# Patient Record
Sex: FEMALE | Race: WHITE | NOT HISPANIC OR LATINO | URBAN - METROPOLITAN AREA
[De-identification: names, ages, dates, MRNs, and addresses within clinical notes are randomized per-mention and may not be internally consistent; named-entity substitution may affect disease eponyms.]

---

## 2017-04-13 ENCOUNTER — OUTPATIENT (OUTPATIENT)
Dept: OUTPATIENT SERVICES | Facility: HOSPITAL | Age: 52
LOS: 1 days | Discharge: HOME | End: 2017-04-13

## 2017-06-27 DIAGNOSIS — R06.02 SHORTNESS OF BREATH: ICD-10-CM

## 2019-06-25 ENCOUNTER — OUTPATIENT (OUTPATIENT)
Dept: OUTPATIENT SERVICES | Facility: HOSPITAL | Age: 54
LOS: 1 days | Discharge: HOME | End: 2019-06-25

## 2019-06-25 DIAGNOSIS — Z00.00 ENCOUNTER FOR GENERAL ADULT MEDICAL EXAMINATION WITHOUT ABNORMAL FINDINGS: ICD-10-CM

## 2019-06-25 DIAGNOSIS — E66.9 OBESITY, UNSPECIFIED: ICD-10-CM

## 2019-12-12 ENCOUNTER — APPOINTMENT (OUTPATIENT)
Dept: SURGERY | Facility: CLINIC | Age: 54
End: 2019-12-12

## 2020-02-19 PROBLEM — Z00.00 ENCOUNTER FOR PREVENTIVE HEALTH EXAMINATION: Status: ACTIVE | Noted: 2020-02-19

## 2020-02-25 ENCOUNTER — APPOINTMENT (OUTPATIENT)
Dept: SURGERY | Facility: CLINIC | Age: 55
End: 2020-02-25

## 2020-11-22 ENCOUNTER — EMERGENCY (EMERGENCY)
Facility: HOSPITAL | Age: 55
LOS: 0 days | Discharge: HOME | End: 2020-11-23
Attending: EMERGENCY MEDICINE | Admitting: EMERGENCY MEDICINE
Payer: COMMERCIAL

## 2020-11-22 VITALS
RESPIRATION RATE: 18 BRPM | OXYGEN SATURATION: 98 % | WEIGHT: 216.93 LBS | TEMPERATURE: 98 F | HEART RATE: 78 BPM | SYSTOLIC BLOOD PRESSURE: 180 MMHG | DIASTOLIC BLOOD PRESSURE: 97 MMHG

## 2020-11-22 DIAGNOSIS — R10.9 UNSPECIFIED ABDOMINAL PAIN: ICD-10-CM

## 2020-11-22 DIAGNOSIS — F17.210 NICOTINE DEPENDENCE, CIGARETTES, UNCOMPLICATED: ICD-10-CM

## 2020-11-22 DIAGNOSIS — R10.84 GENERALIZED ABDOMINAL PAIN: ICD-10-CM

## 2020-11-22 LAB
ALBUMIN SERPL ELPH-MCNC: 3.7 G/DL — SIGNIFICANT CHANGE UP (ref 3.5–5.2)
ALP SERPL-CCNC: 63 U/L — SIGNIFICANT CHANGE UP (ref 30–115)
ALT FLD-CCNC: 16 U/L — SIGNIFICANT CHANGE UP (ref 0–41)
ANION GAP SERPL CALC-SCNC: 12 MMOL/L — SIGNIFICANT CHANGE UP (ref 7–14)
APPEARANCE UR: CLEAR — SIGNIFICANT CHANGE UP
AST SERPL-CCNC: 17 U/L — SIGNIFICANT CHANGE UP (ref 0–41)
BACTERIA # UR AUTO: SIGNIFICANT CHANGE UP
BASOPHILS # BLD AUTO: 0.04 K/UL — SIGNIFICANT CHANGE UP (ref 0–0.2)
BASOPHILS NFR BLD AUTO: 0.7 % — SIGNIFICANT CHANGE UP (ref 0–1)
BILIRUB SERPL-MCNC: <0.2 MG/DL — SIGNIFICANT CHANGE UP (ref 0.2–1.2)
BILIRUB UR-MCNC: NEGATIVE — SIGNIFICANT CHANGE UP
BUN SERPL-MCNC: 18 MG/DL — SIGNIFICANT CHANGE UP (ref 10–20)
CALCIUM SERPL-MCNC: 8.7 MG/DL — SIGNIFICANT CHANGE UP (ref 8.5–10.1)
CHLORIDE SERPL-SCNC: 112 MMOL/L — HIGH (ref 98–110)
CO2 SERPL-SCNC: 20 MMOL/L — SIGNIFICANT CHANGE UP (ref 17–32)
COLOR SPEC: SIGNIFICANT CHANGE UP
CREAT SERPL-MCNC: 0.6 MG/DL — LOW (ref 0.7–1.5)
DIFF PNL FLD: SIGNIFICANT CHANGE UP
EOSINOPHIL # BLD AUTO: 0.17 K/UL — SIGNIFICANT CHANGE UP (ref 0–0.7)
EOSINOPHIL NFR BLD AUTO: 2.8 % — SIGNIFICANT CHANGE UP (ref 0–8)
EPI CELLS # UR: 5 /HPF — SIGNIFICANT CHANGE UP (ref 0–5)
GLUCOSE SERPL-MCNC: 107 MG/DL — HIGH (ref 70–99)
GLUCOSE UR QL: NEGATIVE — SIGNIFICANT CHANGE UP
HCT VFR BLD CALC: 41.2 % — SIGNIFICANT CHANGE UP (ref 37–47)
HGB BLD-MCNC: 13.2 G/DL — SIGNIFICANT CHANGE UP (ref 12–16)
HYALINE CASTS # UR AUTO: 0 /LPF — SIGNIFICANT CHANGE UP (ref 0–7)
IMM GRANULOCYTES NFR BLD AUTO: 0.3 % — SIGNIFICANT CHANGE UP (ref 0.1–0.3)
KETONES UR-MCNC: NEGATIVE — SIGNIFICANT CHANGE UP
LACTATE SERPL-SCNC: 1.7 MMOL/L — SIGNIFICANT CHANGE UP (ref 0.7–2)
LEUKOCYTE ESTERASE UR-ACNC: ABNORMAL
LIDOCAIN IGE QN: 37 U/L — SIGNIFICANT CHANGE UP (ref 7–60)
LYMPHOCYTES # BLD AUTO: 1.87 K/UL — SIGNIFICANT CHANGE UP (ref 1.2–3.4)
LYMPHOCYTES # BLD AUTO: 31.3 % — SIGNIFICANT CHANGE UP (ref 20.5–51.1)
MCHC RBC-ENTMCNC: 30.1 PG — SIGNIFICANT CHANGE UP (ref 27–31)
MCHC RBC-ENTMCNC: 32 G/DL — SIGNIFICANT CHANGE UP (ref 32–37)
MCV RBC AUTO: 93.8 FL — SIGNIFICANT CHANGE UP (ref 81–99)
MONOCYTES # BLD AUTO: 0.69 K/UL — HIGH (ref 0.1–0.6)
MONOCYTES NFR BLD AUTO: 11.6 % — HIGH (ref 1.7–9.3)
NEUTROPHILS # BLD AUTO: 3.18 K/UL — SIGNIFICANT CHANGE UP (ref 1.4–6.5)
NEUTROPHILS NFR BLD AUTO: 53.3 % — SIGNIFICANT CHANGE UP (ref 42.2–75.2)
NITRITE UR-MCNC: NEGATIVE — SIGNIFICANT CHANGE UP
NRBC # BLD: 0 /100 WBCS — SIGNIFICANT CHANGE UP (ref 0–0)
PH UR: 6 — SIGNIFICANT CHANGE UP (ref 5–8)
PLATELET # BLD AUTO: 175 K/UL — SIGNIFICANT CHANGE UP (ref 130–400)
POTASSIUM SERPL-MCNC: 3.9 MMOL/L — SIGNIFICANT CHANGE UP (ref 3.5–5)
POTASSIUM SERPL-SCNC: 3.9 MMOL/L — SIGNIFICANT CHANGE UP (ref 3.5–5)
PROT SERPL-MCNC: 6 G/DL — SIGNIFICANT CHANGE UP (ref 6–8)
PROT UR-MCNC: NEGATIVE — SIGNIFICANT CHANGE UP
RBC # BLD: 4.39 M/UL — SIGNIFICANT CHANGE UP (ref 4.2–5.4)
RBC # FLD: 13 % — SIGNIFICANT CHANGE UP (ref 11.5–14.5)
RBC CASTS # UR COMP ASSIST: 2 /HPF — SIGNIFICANT CHANGE UP (ref 0–4)
SODIUM SERPL-SCNC: 144 MMOL/L — SIGNIFICANT CHANGE UP (ref 135–146)
SP GR SPEC: 1.03 — SIGNIFICANT CHANGE UP (ref 1.01–1.03)
UROBILINOGEN FLD QL: SIGNIFICANT CHANGE UP
WBC # BLD: 5.97 K/UL — SIGNIFICANT CHANGE UP (ref 4.8–10.8)
WBC # FLD AUTO: 5.97 K/UL — SIGNIFICANT CHANGE UP (ref 4.8–10.8)
WBC UR QL: 10 /HPF — HIGH (ref 0–5)

## 2020-11-22 PROCEDURE — 71045 X-RAY EXAM CHEST 1 VIEW: CPT | Mod: 26

## 2020-11-22 PROCEDURE — 93010 ELECTROCARDIOGRAM REPORT: CPT

## 2020-11-22 PROCEDURE — 71275 CT ANGIOGRAPHY CHEST: CPT | Mod: 26

## 2020-11-22 PROCEDURE — 74174 CTA ABD&PLVS W/CONTRAST: CPT | Mod: 26

## 2020-11-22 PROCEDURE — 99285 EMERGENCY DEPT VISIT HI MDM: CPT

## 2020-11-22 RX ORDER — DEXAMETHASONE 0.5 MG/5ML
10 ELIXIR ORAL ONCE
Refills: 0 | Status: DISCONTINUED | OUTPATIENT
Start: 2020-11-22 | End: 2020-11-22

## 2020-11-22 RX ORDER — METHOCARBAMOL 500 MG/1
1500 TABLET, FILM COATED ORAL ONCE
Refills: 0 | Status: COMPLETED | OUTPATIENT
Start: 2020-11-22 | End: 2020-11-22

## 2020-11-22 RX ORDER — SODIUM CHLORIDE 9 MG/ML
1000 INJECTION INTRAMUSCULAR; INTRAVENOUS; SUBCUTANEOUS ONCE
Refills: 0 | Status: COMPLETED | OUTPATIENT
Start: 2020-11-22 | End: 2020-11-22

## 2020-11-22 RX ORDER — MORPHINE SULFATE 50 MG/1
4 CAPSULE, EXTENDED RELEASE ORAL ONCE
Refills: 0 | Status: DISCONTINUED | OUTPATIENT
Start: 2020-11-22 | End: 2020-11-22

## 2020-11-22 RX ADMIN — METHOCARBAMOL 1500 MILLIGRAM(S): 500 TABLET, FILM COATED ORAL at 20:37

## 2020-11-22 RX ADMIN — MORPHINE SULFATE 4 MILLIGRAM(S): 50 CAPSULE, EXTENDED RELEASE ORAL at 20:37

## 2020-11-22 RX ADMIN — SODIUM CHLORIDE 1000 MILLILITER(S): 9 INJECTION INTRAMUSCULAR; INTRAVENOUS; SUBCUTANEOUS at 20:37

## 2020-11-22 NOTE — ED ADULT NURSE NOTE - NSIMPLEMENTINTERV_GEN_ALL_ED
Implemented All Universal Safety Interventions:  [place] to call system. Call bell, personal items and telephone within reach. Instruct patient to call for assistance. Room bathroom lighting operational. Non-slip footwear when patient is off stretcher. Physically safe environment: no spills, clutter or unnecessary equipment. Stretcher in lowest position, wheels locked, appropriate side rails in place.

## 2020-11-22 NOTE — ED ADULT NURSE NOTE - OBJECTIVE STATEMENT
As per pt, "My stomach hurts and there is a bulge on the right side, it has gotten bigger in the past 2 months. also yesterday my right arm was tingling."

## 2020-11-23 VITALS
SYSTOLIC BLOOD PRESSURE: 135 MMHG | RESPIRATION RATE: 18 BRPM | TEMPERATURE: 98 F | HEART RATE: 79 BPM | DIASTOLIC BLOOD PRESSURE: 61 MMHG

## 2020-11-23 NOTE — ED PROVIDER NOTE - CLINICAL SUMMARY MEDICAL DECISION MAKING FREE TEXT BOX
55F p/w generalized abd pain and conisation. denies n/v/fevers. Pt also notes of left leg pain, and right hand tingling. vs- hypertensive. On exam with ventral hernia that is easily reducible. given morphine for pain, and steroid/robaxin for sciatica pain the pt has. concern for intra abd path- ct revealed no abd pathology. pt is feeling better after the morphine. Will dc home with surgery fu.

## 2020-11-23 NOTE — ED PROVIDER NOTE - PHYSICAL EXAMINATION
Gen: NAD, AOx3  Head: NCAT  HEENT: PERRL, oral mucosa moist, normal conjunctiva, oropharynx clear without exudate or erythema  Lung: CTAB, no respiratory distress, no wheezing, rales, rhonchi  CV: normal s1/s2, rrr, Normal perfusion, pulses 2+ throughout  Abd: soft, BLQ tenderness w/ no rebounding/guarding, reducible ventral hernia, ND, no CVA tenderness  Genitourinary: no pelvic tenderness  MSK: No edema, no visible deformities, full range of motion in all 4 extremities  Neuro: CN II-XII grossly intact, No focal neurologic deficits, no nystagmus/pronator drift, coordination/sensation intact, strength 5/5 BUE/BLE, steady gait   Skin: No rash   Psych: normal affect

## 2020-11-23 NOTE — ED PROVIDER NOTE - PATIENT PORTAL LINK FT
You can access the FollowMyHealth Patient Portal offered by [facility] by registering at the following website: [email or web address]. By joining [employer]’s FollowMyHealth portal, you will also be able to view your health information using other applications (apps) compatible with our system.

## 2020-11-23 NOTE — ED PROVIDER NOTE - OBJECTIVE STATEMENT
54 yo female with no pertinent pmh presents c/o generalized abdominal pain that has been intermittently present for one week. she states to have noted a abdominal mass about 1 month prior but this week has had some tenderness to the area. pt denies any n/v/d and states to have experienced constipation. also mention to have experienced intermittent L leg pain described as sharp in nature with no recent injuries/trauma. denies any other symptoms including fevers, chill, headache, recent illness/travel, cough, abdominal pain, chest pain, or SOB.

## 2020-11-23 NOTE — ED PROVIDER NOTE - NSFOLLOWUPCLINICS_GEN_ALL_ED_FT
[facility]  Surgery  [address] [place] [address]  Phone: [phone]  Fax:   Follow Up Time: 1-3 Days    Neurosurgery at [facility]  Neurosurgery  [address] [place] [address], [address]  [place] [address]  Phone: [phone]  Fax:   Follow Up Time: 1-3 Days

## 2020-11-23 NOTE — ED PROVIDER NOTE - NSFOLLOWUPINSTRUCTIONS_ED_ALL_ED_FT
Please follow up with your primary care physician within 24-72 hours and return immediately if symptoms worsen.    Abdominal Pain    WHAT YOU NEED TO KNOW:    Abdominal pain can be dull, achy, or sharp. You may have pain in one area of your abdomen, or in your entire abdomen. Your pain may be caused by a condition such as constipation, food sensitivity or poisoning, infection, or a blockage. Abdominal pain can also be from a hernia, appendicitis, or an ulcer. Liver, gallbladder, or kidney conditions can also cause abdominal pain. The cause of your abdominal pain may be unknown.     DISCHARGE INSTRUCTIONS:    Return to the emergency department if:     You have new chest pain or shortness of breath.      You have pulsing pain in your upper abdomen or lower back that suddenly becomes constant.      Your pain is in the right lower abdominal area and worsens with movement.       You have a fever over 100.4°F (38°C) or shaking chills.       You are vomiting and cannot keep food or liquids down.       Your pain does not improve or gets worse over the next 8 to 12 hours.       You see blood in your vomit or bowel movements, or they look black and tarry.       Your skin or the whites of your eyes turn yellow.       You are a woman and have a large amount of vaginal bleeding that is not your monthly period.     Contact your healthcare provider if:     You have pain in your lower back.       You are a man and have pain in your testicles.      You have pain when you urinate.       You have questions or concerns about your condition or care.    Follow up with your healthcare provider within 24 hours or as directed: Write down your questions so you remember to ask them during your visits.     Medicines:     Medicines may be given to calm your stomach and prevent vomiting or to decrease pain. Ask how to take pain medicine safely.      Take your medicine as directed. Contact your healthcare provider if you think your medicine is not helping or if you have side effects. Tell him of her if you are allergic to any medicine. Keep a list of the medicines, vitamins, and herbs you take. Include the amounts, and when and why you take them. Bring the list or the pill bottles to follow-up visits. Carry your medicine list with you in case of an emergency.    Hernia, Adult    A hernia is the bulging of an organ or tissue through a weak spot in the muscles of the abdomen (abdominal wall). Hernias develop most often near the navel or groin.    There are many kinds of hernias. Common kinds include:    Femoral hernia. This kind of hernia develops under the groin in the upper thigh area.  Inguinal hernia. This kind of hernia develops in the groin or scrotum.  Umbilical hernia. This kind of hernia develops near the navel.  Hiatal hernia. This kind of hernia causes part of the stomach to be pushed up into the chest.  Incisional hernia. This kind of hernia bulges through a scar from an abdominal surgery.     CAUSES  This condition may be caused by:    Heavy lifting.  Coughing over a long period of time.  Straining to have a bowel movement.  An incision made during an abdominal surgery.   A birth defect (congenital defect).  Excess weight or obesity.  Smoking.  Poor nutrition.  Cystic fibrosis.  Excess fluid in the abdomen.  Undescended testicles.    SYMPTOMS  Symptoms of a hernia include:    A lump on the abdomen. This is the first sign of a hernia. The lump may become more obvious with standing, straining, or coughing. It may get bigger over time if it is not treated or if the condition causing it is not treated.  Pain. A hernia is usually painless, but it may become painful over time if treatment is delayed. The pain is usually dull and may get worse with standing or lifting heavy objects.    Sometimes a hernia gets tightly squeezed in the weak spot (strangulated) or stuck there (incarcerated) and causes additional symptoms. These symptoms may include:    Vomiting.  Nausea.  Constipation.  Irritability.    DIAGNOSIS  A hernia may be diagnosed with:    A physical exam. During the exam your health care provider may ask you to cough or to make a specific movement, because a hernia is usually more visible when you move.  Imaging tests. These can include:  X-rays.  Ultrasound.  CT scan.    TREATMENT  A hernia that is small and painless may not need to be treated. A hernia that is large or painful may be treated with surgery. Inguinal hernias may be treated with surgery to prevent incarceration or strangulation. Strangulated hernias are always treated with surgery, because lack of blood to the trapped organ or tissue can cause it to die.    Surgery to treat a hernia involves pushing the bulge back into place and repairing the weak part of the abdomen.    HOME CARE INSTRUCTIONS  Avoid straining.  Do not lift anything heavier than 10 lb (4.5 kg).  Lift with your leg muscles, not your back muscles. This helps avoid strain.  When coughing, try to cough gently.  Prevent constipation. Constipation leads to straining with bowel movements, which can make a hernia worse or cause a hernia repair to break down. You can prevent constipation by:  Eating a high-fiber diet that includes plenty of fruits and vegetables.  Drinking enough fluids to keep your urine clear or pale yellow. Aim to drink 6–8 glasses of water per day.  Using a stool softener as directed by your health care provider.  Lose weight, if you are overweight.  Do not use any tobacco products, including cigarettes, chewing tobacco, or electronic cigarettes. If you need help quitting, ask your health care provider.  Keep all follow-up visits as directed by your health care provider. This is important. Your health care provider may need to monitor your condition.    SEEK MEDICAL CARE IF:  You have swelling, redness, and pain in the affected area.  Your bowel habits change.    SEEK IMMEDIATE MEDICAL CARE IF:  You have a fever.  You have abdominal pain that is getting worse.  You feel nauseous or you vomit.  You cannot push the hernia back in place by gently pressing on it while you are lying down.  The hernia:  Changes in shape or size.  Is stuck outside the abdomen.  Becomes discolored.  Feels hard or tender.    ADDITIONAL NOTES AND INSTRUCTIONS    Please follow up with your Primary MD in 24-48 hr.  Seek immediate medical care for any new/worsening signs or symptoms.

## 2020-11-23 NOTE — ED PROVIDER NOTE - NS ED ROS FT
Constitutional: (-) fever  Eyes/ENT: (-) visual changes   Cardiovascular: (-) chest pain, (-) syncope  Respiratory: (-) cough, (-) shortness of breath  Gastrointestinal: (-) vomiting, (-) diarrhea, abd pain  Genitourinary: (-) dysuria, (-) hesitancy, (-) frequency   Musculoskeletal: (-) neck pain, (-) back pain, L leg pain  Integumentary: (-) rash, (-) edema  Neurological: (-) headache, (-) altered mental status  Allergic/Immunologic: (-) pruritus

## 2020-11-24 LAB
CULTURE RESULTS: SIGNIFICANT CHANGE UP
SPECIMEN SOURCE: SIGNIFICANT CHANGE UP

## 2021-05-02 ENCOUNTER — EMERGENCY (EMERGENCY)
Facility: HOSPITAL | Age: 56
LOS: 0 days | Discharge: HOME | End: 2021-05-02
Attending: EMERGENCY MEDICINE | Admitting: EMERGENCY MEDICINE
Payer: COMMERCIAL

## 2021-05-02 VITALS
RESPIRATION RATE: 18 BRPM | WEIGHT: 220.46 LBS | OXYGEN SATURATION: 96 % | HEIGHT: 67 IN | DIASTOLIC BLOOD PRESSURE: 88 MMHG | SYSTOLIC BLOOD PRESSURE: 156 MMHG | HEART RATE: 95 BPM | TEMPERATURE: 99 F

## 2021-05-02 VITALS
SYSTOLIC BLOOD PRESSURE: 155 MMHG | RESPIRATION RATE: 18 BRPM | HEART RATE: 86 BPM | DIASTOLIC BLOOD PRESSURE: 93 MMHG | TEMPERATURE: 98 F | OXYGEN SATURATION: 96 %

## 2021-05-02 DIAGNOSIS — M79.604 PAIN IN RIGHT LEG: ICD-10-CM

## 2021-05-02 DIAGNOSIS — R51.9 HEADACHE, UNSPECIFIED: ICD-10-CM

## 2021-05-02 DIAGNOSIS — R11.0 NAUSEA: ICD-10-CM

## 2021-05-02 DIAGNOSIS — J45.909 UNSPECIFIED ASTHMA, UNCOMPLICATED: ICD-10-CM

## 2021-05-02 DIAGNOSIS — M79.605 PAIN IN LEFT LEG: ICD-10-CM

## 2021-05-02 DIAGNOSIS — Z86.73 PERSONAL HISTORY OF TRANSIENT ISCHEMIC ATTACK (TIA), AND CEREBRAL INFARCTION WITHOUT RESIDUAL DEFICITS: ICD-10-CM

## 2021-05-02 DIAGNOSIS — E78.5 HYPERLIPIDEMIA, UNSPECIFIED: ICD-10-CM

## 2021-05-02 DIAGNOSIS — Z87.891 PERSONAL HISTORY OF NICOTINE DEPENDENCE: ICD-10-CM

## 2021-05-02 DIAGNOSIS — I10 ESSENTIAL (PRIMARY) HYPERTENSION: ICD-10-CM

## 2021-05-02 LAB
ALBUMIN SERPL ELPH-MCNC: 4.4 G/DL — SIGNIFICANT CHANGE UP (ref 3.5–5.2)
ALP SERPL-CCNC: 55 U/L — SIGNIFICANT CHANGE UP (ref 30–115)
ALT FLD-CCNC: 33 U/L — SIGNIFICANT CHANGE UP (ref 0–41)
ANION GAP SERPL CALC-SCNC: 10 MMOL/L — SIGNIFICANT CHANGE UP (ref 7–14)
AST SERPL-CCNC: 28 U/L — SIGNIFICANT CHANGE UP (ref 0–41)
BASOPHILS # BLD AUTO: 0.04 K/UL — SIGNIFICANT CHANGE UP (ref 0–0.2)
BASOPHILS NFR BLD AUTO: 0.8 % — SIGNIFICANT CHANGE UP (ref 0–1)
BILIRUB SERPL-MCNC: 0.3 MG/DL — SIGNIFICANT CHANGE UP (ref 0.2–1.2)
BUN SERPL-MCNC: 11 MG/DL — SIGNIFICANT CHANGE UP (ref 10–20)
CALCIUM SERPL-MCNC: 9.3 MG/DL — SIGNIFICANT CHANGE UP (ref 8.5–10.1)
CHLORIDE SERPL-SCNC: 103 MMOL/L — SIGNIFICANT CHANGE UP (ref 98–110)
CK SERPL-CCNC: 163 U/L — SIGNIFICANT CHANGE UP (ref 0–225)
CO2 SERPL-SCNC: 24 MMOL/L — SIGNIFICANT CHANGE UP (ref 17–32)
CREAT SERPL-MCNC: 0.8 MG/DL — SIGNIFICANT CHANGE UP (ref 0.7–1.5)
EOSINOPHIL # BLD AUTO: 0.1 K/UL — SIGNIFICANT CHANGE UP (ref 0–0.7)
EOSINOPHIL NFR BLD AUTO: 2 % — SIGNIFICANT CHANGE UP (ref 0–8)
GLUCOSE SERPL-MCNC: 86 MG/DL — SIGNIFICANT CHANGE UP (ref 70–99)
HCT VFR BLD CALC: 45.5 % — SIGNIFICANT CHANGE UP (ref 37–47)
HGB BLD-MCNC: 15.3 G/DL — SIGNIFICANT CHANGE UP (ref 12–16)
IMM GRANULOCYTES NFR BLD AUTO: 0.2 % — SIGNIFICANT CHANGE UP (ref 0.1–0.3)
LYMPHOCYTES # BLD AUTO: 0.69 K/UL — LOW (ref 1.2–3.4)
LYMPHOCYTES # BLD AUTO: 13.7 % — LOW (ref 20.5–51.1)
MCHC RBC-ENTMCNC: 29.7 PG — SIGNIFICANT CHANGE UP (ref 27–31)
MCHC RBC-ENTMCNC: 33.6 G/DL — SIGNIFICANT CHANGE UP (ref 32–37)
MCV RBC AUTO: 88.3 FL — SIGNIFICANT CHANGE UP (ref 81–99)
MONOCYTES # BLD AUTO: 0.77 K/UL — HIGH (ref 0.1–0.6)
MONOCYTES NFR BLD AUTO: 15.3 % — HIGH (ref 1.7–9.3)
NEUTROPHILS # BLD AUTO: 3.42 K/UL — SIGNIFICANT CHANGE UP (ref 1.4–6.5)
NEUTROPHILS NFR BLD AUTO: 68 % — SIGNIFICANT CHANGE UP (ref 42.2–75.2)
NRBC # BLD: 0 /100 WBCS — SIGNIFICANT CHANGE UP (ref 0–0)
PLATELET # BLD AUTO: 162 K/UL — SIGNIFICANT CHANGE UP (ref 130–400)
POTASSIUM SERPL-MCNC: 4.3 MMOL/L — SIGNIFICANT CHANGE UP (ref 3.5–5)
POTASSIUM SERPL-SCNC: 4.3 MMOL/L — SIGNIFICANT CHANGE UP (ref 3.5–5)
PROT SERPL-MCNC: 7 G/DL — SIGNIFICANT CHANGE UP (ref 6–8)
RBC # BLD: 5.15 M/UL — SIGNIFICANT CHANGE UP (ref 4.2–5.4)
RBC # FLD: 13 % — SIGNIFICANT CHANGE UP (ref 11.5–14.5)
SODIUM SERPL-SCNC: 137 MMOL/L — SIGNIFICANT CHANGE UP (ref 135–146)
WBC # BLD: 5.03 K/UL — SIGNIFICANT CHANGE UP (ref 4.8–10.8)
WBC # FLD AUTO: 5.03 K/UL — SIGNIFICANT CHANGE UP (ref 4.8–10.8)

## 2021-05-02 PROCEDURE — 99285 EMERGENCY DEPT VISIT HI MDM: CPT

## 2021-05-02 PROCEDURE — 93970 EXTREMITY STUDY: CPT | Mod: 26

## 2021-05-02 PROCEDURE — 70450 CT HEAD/BRAIN W/O DYE: CPT | Mod: 26,MA

## 2021-05-02 RX ORDER — ACETAMINOPHEN 500 MG
650 TABLET ORAL ONCE
Refills: 0 | Status: COMPLETED | OUTPATIENT
Start: 2021-05-02 | End: 2021-05-02

## 2021-05-02 RX ORDER — METOCLOPRAMIDE HCL 10 MG
10 TABLET ORAL ONCE
Refills: 0 | Status: COMPLETED | OUTPATIENT
Start: 2021-05-02 | End: 2021-05-02

## 2021-05-02 RX ORDER — KETOROLAC TROMETHAMINE 30 MG/ML
15 SYRINGE (ML) INJECTION ONCE
Refills: 0 | Status: DISCONTINUED | OUTPATIENT
Start: 2021-05-02 | End: 2021-05-02

## 2021-05-02 RX ORDER — SODIUM CHLORIDE 9 MG/ML
1000 INJECTION INTRAMUSCULAR; INTRAVENOUS; SUBCUTANEOUS ONCE
Refills: 0 | Status: COMPLETED | OUTPATIENT
Start: 2021-05-02 | End: 2021-05-02

## 2021-05-02 RX ORDER — DIPHENHYDRAMINE HCL 50 MG
25 CAPSULE ORAL ONCE
Refills: 0 | Status: COMPLETED | OUTPATIENT
Start: 2021-05-02 | End: 2021-05-02

## 2021-05-02 RX ADMIN — Medication 15 MILLIGRAM(S): at 15:24

## 2021-05-02 RX ADMIN — SODIUM CHLORIDE 1000 MILLILITER(S): 9 INJECTION INTRAMUSCULAR; INTRAVENOUS; SUBCUTANEOUS at 12:55

## 2021-05-02 RX ADMIN — Medication 650 MILLIGRAM(S): at 12:52

## 2021-05-02 RX ADMIN — Medication 10 MILLIGRAM(S): at 12:57

## 2021-05-02 RX ADMIN — Medication 25 MILLIGRAM(S): at 12:56

## 2021-05-02 NOTE — ED PROVIDER NOTE - ATTENDING CONTRIBUTION TO CARE
56F PMH HTN HL asthma, TIA, p/w 1 day of ha and bilat LE pain. no trauma. sharp constant nonradiating. ha is diffuse throbbing. no numbness, weakness, tingling. no blurry vision, slurred speech, trouble walking. no fever, cough, uri, neck stiffness, ams. no cp, sob. no le swelling, redness. took a benadryl at home w no relief and came to ED.     on exam, AFVSS, well norris nad, ncat, eomi, perrla, mmm, lctab, rrr nl s1s2 no mrg, abd soft ntnd, aaox3, CN 2-12 intact, No nystagmus.  5/5 motor x 4 ext, SILT x 4 extremities, No facial droop or slurred speech. No pronator drift.  Normal rapid alternating movement and finger nose finger bilaterally. No midline C/T/L tenderness to palpation or step off. Normal gait, No ataxia. , no le edema, +bilat LE calf ttp, no erythema or warmth, 5/5 motor, silt, 2+ dp pulse,     a/p; HA, LE pain. will do LE dopplers r/o DVT, CTH r/o ICH. ivf pain control re-eval.

## 2021-05-02 NOTE — ED PROVIDER NOTE - PATIENT PORTAL LINK FT
You can access the FollowMyHealth Patient Portal offered by [facility] by registering at the following website: [email or web address]. By joining [employer]’s FollowMyHealth portal, you will also be able to view your health information using other applications (apps) compatible with our system.

## 2021-05-02 NOTE — ED PROVIDER NOTE - OBJECTIVE STATEMENT
56 year old F with hx of HTN, HLD, asthma, TIA x several years ago c/o headache and b/l LE pain. Pt sts for one day has had generalized pressure like headache with assoc nausea. Pt also c/o b/l calf pain R> L x 1 day. calf pain is crampy and worse with walking. Pt denies any fever/chills, cough, uri symptoms, dizziness, nausea/vomiting, visual changes, weakness, paresthesias, hx of DVT/PE, chest pain, sob.

## 2021-05-02 NOTE — ED PROVIDER NOTE - CARE PROVIDER_API CALL
Jose Cardenas)  Neuromuscular Medicine  [address] [place] [address], [address]  [place], [place] [id number]  Phone: [phone]  Fax: [phone]  Follow Up Time:

## 2021-05-02 NOTE — ED PROVIDER NOTE - NS ED ROS FT
Constitutional: no fever, chills, no recent weight loss, change in appetite or malaise  Eyes: no redness/discharge/pain/vision changes  ENT: no rhinorrhea/ear pain/sore throat  Cardiac: No chest pain, SOB or edema.  Respiratory: No cough or respiratory distress  GI: + nausea. No vomiting, diarrhea or abdominal pain.  : No dysuria, frequency, urgency or hematuria  MS: no pain to back or extremities, no loss of ROM, no weakness  Neuro: + headache. No weakness. No LOC.  Extrem: + b/l calf pain. No swelling  Skin: No skin rash.  Endocrine: No history of thyroid disease or diabetes.  Except as documented in the HPI, all other systems are negative.

## 2021-05-02 NOTE — ED PROVIDER NOTE - PHYSICAL EXAMINATION
CONSTITUTIONAL: Well-appearing; well-nourished; in no apparent distress.   EYES: PERRL; EOM intact.   ENT: normal nose; no rhinorrhea; normal pharynx with no tonsillar hypertrophy.   NECK: Supple; non-tender; no cervical lymphadenopathy.   CARDIOVASCULAR: Normal S1, S2; no murmurs, rubs, or gallops.   RESPIRATORY: Normal chest excursion with respiration; breath sounds clear and equal bilaterally; no wheezes, rhonchi, or rales.  GI/: Normal bowel sounds; non-distended; non-tender; no palpable organomegaly.   MS: No evidence of trauma or deformity. No edema. + mild ttp to rt calf. Normal ROM in all four extremities; non-tender to palpation; distal pulses are normal.   SKIN: Normal for age and race; warm; dry; good turgor; no apparent lesions or exudate.   NEURO/PSYCH: A & O x 4; grossly unremarkable. mood and manner are appropriate. No focal deficits. No facial droop. No tongue deviation. Cerebellar intact. Sensation intact

## 2021-05-02 NOTE — ED PROVIDER NOTE - NSFOLLOWUPINSTRUCTIONS_ED_ALL_ED_FT
Headache    A headache is pain or discomfort felt around the head or neck area. The specific cause of a headache may not be found as there are many types including tension headaches, migraine headaches, and cluster headaches. Watch your condition for any changes. Things you can do to manage your pain include taking over the counter and prescription medications as instructed by your health care provider, lying down in a dark quiet room, limiting stress, getting regular sleep, and refraining from alcohol and tobacco products.    SEEK IMMEDIATE MEDICAL CARE IF YOU HAVE ANY OF THE FOLLOWING SYMPTOMS: fever, vomiting, stiff neck, loss of vision, problems with speech, muscle weakness, loss of balance, trouble walking, passing out, or confusion.    Leg Pain    WHAT YOU NEED TO KNOW:    Leg pain may be caused by a variety of health conditions.    DISCHARGE INSTRUCTIONS:    Return to the emergency department if:     You have a fever.  Your leg starts to swell.  Your leg pain gets worse.  You have numbness or tingling in your leg or toes.  You cannot put any weight on or move your leg.    Contact your healthcare provider if:     Your pain does not decrease, even after treatment.  You have questions or concerns about your condition or care.    Medicines:     NSAIDs, such as ibuprofen, help decrease swelling, pain, and fever. This medicine is available with or without a doctor's order. NSAIDs can cause stomach bleeding or kidney problems in certain people. If you take blood thinner medicine, always ask your healthcare provider if NSAIDs are safe for you. Always read the medicine label and follow directions.    Take your medicine as directed. Contact your healthcare provider if you think your medicine is not helping or if you have side effects. Tell him of her if you are allergic to any medicine. Keep a list of the medicines, vitamins, and herbs you take. Include the amounts, and when and why you take them. Bring the list or the pill bottles to follow-up visits. Carry your medicine list with you in case of an emergency.    Follow up with your healthcare provider as directed: You may need more tests to find the cause of your leg pain. You may need to see an orthopedic specialist or a physical therapist. Write down your questions so you remember to ask them during your visits.    Manage your leg pain:     Elevate your injured leg above the level of your heart as often as you can. This will help decrease swelling and pain. If possible, prop your leg on pillows or blankets to keep the area elevated comfortably.     Use assistive devices as directed. You may need to use a cane or crutches. Assistive devices help decrease pain and pressure on your leg when you walk. Ask your healthcare provider for more information about assistive devices and how to use them correctly.    Maintain a healthy weight. Extra body weight can cause pressure and pain in your hip, knee, and ankle joints. Ask your healthcare provider how much you should weigh. Ask him to help you create a weight loss plan if you are overweight.     Please follow up with your primary care doctor within one week.

## 2021-05-02 NOTE — ED PROVIDER NOTE - CLINICAL SUMMARY MEDICAL DECISION MAKING FREE TEXT BOX
ed work up unremarkable, pt nv intact.. pt symptoms resolved, dc home supportive care, f/u pmd 1-2 weeks, strict return precautions provided

## 2021-05-19 ENCOUNTER — INPATIENT (INPATIENT)
Facility: HOSPITAL | Age: 56
LOS: 2 days | Discharge: HOME | End: 2021-05-22
Attending: STUDENT IN AN ORGANIZED HEALTH CARE EDUCATION/TRAINING PROGRAM | Admitting: STUDENT IN AN ORGANIZED HEALTH CARE EDUCATION/TRAINING PROGRAM
Payer: COMMERCIAL

## 2021-05-19 VITALS
OXYGEN SATURATION: 96 % | DIASTOLIC BLOOD PRESSURE: 92 MMHG | HEIGHT: 67 IN | TEMPERATURE: 98 F | SYSTOLIC BLOOD PRESSURE: 143 MMHG | WEIGHT: 220.02 LBS | HEART RATE: 86 BPM | RESPIRATION RATE: 25 BRPM

## 2021-05-19 DIAGNOSIS — Z90.721 ACQUIRED ABSENCE OF OVARIES, UNILATERAL: Chronic | ICD-10-CM

## 2021-05-19 LAB
ALBUMIN SERPL ELPH-MCNC: 4.2 G/DL — SIGNIFICANT CHANGE UP (ref 3.5–5.2)
ALP SERPL-CCNC: 51 U/L — SIGNIFICANT CHANGE UP (ref 30–115)
ALT FLD-CCNC: 30 U/L — SIGNIFICANT CHANGE UP (ref 0–41)
ANION GAP SERPL CALC-SCNC: 15 MMOL/L — HIGH (ref 7–14)
AST SERPL-CCNC: 21 U/L — SIGNIFICANT CHANGE UP (ref 0–41)
BASE EXCESS BLDA CALC-SCNC: -4 MMOL/L — LOW (ref -2–2)
BASE EXCESS BLDV CALC-SCNC: -0.2 MMOL/L — SIGNIFICANT CHANGE UP (ref -2–2)
BASOPHILS # BLD AUTO: 0.04 K/UL — SIGNIFICANT CHANGE UP (ref 0–0.2)
BASOPHILS NFR BLD AUTO: 0.7 % — SIGNIFICANT CHANGE UP (ref 0–1)
BILIRUB SERPL-MCNC: 0.4 MG/DL — SIGNIFICANT CHANGE UP (ref 0.2–1.2)
BUN SERPL-MCNC: 14 MG/DL — SIGNIFICANT CHANGE UP (ref 10–20)
CA-I SERPL-SCNC: 1.24 MMOL/L — SIGNIFICANT CHANGE UP (ref 1.12–1.3)
CALCIUM SERPL-MCNC: 9.4 MG/DL — SIGNIFICANT CHANGE UP (ref 8.5–10.1)
CHLORIDE SERPL-SCNC: 108 MMOL/L — SIGNIFICANT CHANGE UP (ref 98–110)
CO2 SERPL-SCNC: 18 MMOL/L — SIGNIFICANT CHANGE UP (ref 17–32)
CREAT SERPL-MCNC: 0.8 MG/DL — SIGNIFICANT CHANGE UP (ref 0.7–1.5)
EOSINOPHIL # BLD AUTO: 0.3 K/UL — SIGNIFICANT CHANGE UP (ref 0–0.7)
EOSINOPHIL NFR BLD AUTO: 5.2 % — SIGNIFICANT CHANGE UP (ref 0–8)
GAS PNL BLDV: 142 MMOL/L — SIGNIFICANT CHANGE UP (ref 136–145)
GAS PNL BLDV: SIGNIFICANT CHANGE UP
GAS PNL BLDV: SIGNIFICANT CHANGE UP
GLUCOSE SERPL-MCNC: 93 MG/DL — SIGNIFICANT CHANGE UP (ref 70–99)
HCG SERPL QL: POSITIVE — SIGNIFICANT CHANGE UP
HCG SERPL-ACNC: 5.4 MIU/ML — SIGNIFICANT CHANGE UP
HCO3 BLDA-SCNC: 21 MMOL/L — LOW (ref 23–27)
HCO3 BLDV-SCNC: 25 MMOL/L — SIGNIFICANT CHANGE UP (ref 22–29)
HCT VFR BLD CALC: 43.3 % — SIGNIFICANT CHANGE UP (ref 37–47)
HCT VFR BLDA CALC: 43.6 % — SIGNIFICANT CHANGE UP (ref 34–44)
HGB BLD CALC-MCNC: 14.2 G/DL — SIGNIFICANT CHANGE UP (ref 14–18)
HGB BLD-MCNC: 14.2 G/DL — SIGNIFICANT CHANGE UP (ref 12–16)
IMM GRANULOCYTES NFR BLD AUTO: 0.3 % — SIGNIFICANT CHANGE UP (ref 0.1–0.3)
LACTATE BLDV-MCNC: 0.9 MMOL/L — SIGNIFICANT CHANGE UP (ref 0.5–1.6)
LYMPHOCYTES # BLD AUTO: 1.23 K/UL — SIGNIFICANT CHANGE UP (ref 1.2–3.4)
LYMPHOCYTES # BLD AUTO: 21.1 % — SIGNIFICANT CHANGE UP (ref 20.5–51.1)
MCHC RBC-ENTMCNC: 29.3 PG — SIGNIFICANT CHANGE UP (ref 27–31)
MCHC RBC-ENTMCNC: 32.8 G/DL — SIGNIFICANT CHANGE UP (ref 32–37)
MCV RBC AUTO: 89.3 FL — SIGNIFICANT CHANGE UP (ref 81–99)
MONOCYTES # BLD AUTO: 0.78 K/UL — HIGH (ref 0.1–0.6)
MONOCYTES NFR BLD AUTO: 13.4 % — HIGH (ref 1.7–9.3)
NEUTROPHILS # BLD AUTO: 3.45 K/UL — SIGNIFICANT CHANGE UP (ref 1.4–6.5)
NEUTROPHILS NFR BLD AUTO: 59.3 % — SIGNIFICANT CHANGE UP (ref 42.2–75.2)
NRBC # BLD: 0 /100 WBCS — SIGNIFICANT CHANGE UP (ref 0–0)
PCO2 BLDA: 39 MMHG — SIGNIFICANT CHANGE UP (ref 38–42)
PCO2 BLDV: 44 MMHG — SIGNIFICANT CHANGE UP (ref 41–51)
PH BLDA: 7.35 — LOW (ref 7.38–7.42)
PH BLDV: 7.37 — SIGNIFICANT CHANGE UP (ref 7.26–7.43)
PLATELET # BLD AUTO: 161 K/UL — SIGNIFICANT CHANGE UP (ref 130–400)
PO2 BLDA: 156 MMHG — HIGH (ref 78–95)
PO2 BLDV: 42 MMHG — HIGH (ref 20–40)
POTASSIUM BLDV-SCNC: 3.6 MMOL/L — SIGNIFICANT CHANGE UP (ref 3.3–5.6)
POTASSIUM SERPL-MCNC: 3.8 MMOL/L — SIGNIFICANT CHANGE UP (ref 3.5–5)
POTASSIUM SERPL-SCNC: 3.8 MMOL/L — SIGNIFICANT CHANGE UP (ref 3.5–5)
PROT SERPL-MCNC: 6.7 G/DL — SIGNIFICANT CHANGE UP (ref 6–8)
RBC # BLD: 4.85 M/UL — SIGNIFICANT CHANGE UP (ref 4.2–5.4)
RBC # FLD: 12.9 % — SIGNIFICANT CHANGE UP (ref 11.5–14.5)
SAO2 % BLDA: 99 % — HIGH (ref 94–98)
SAO2 % BLDV: 78 % — SIGNIFICANT CHANGE UP
SARS-COV-2 RNA SPEC QL NAA+PROBE: SIGNIFICANT CHANGE UP
SODIUM SERPL-SCNC: 141 MMOL/L — SIGNIFICANT CHANGE UP (ref 135–146)
WBC # BLD: 5.82 K/UL — SIGNIFICANT CHANGE UP (ref 4.8–10.8)
WBC # FLD AUTO: 5.82 K/UL — SIGNIFICANT CHANGE UP (ref 4.8–10.8)

## 2021-05-19 PROCEDURE — 99222 1ST HOSP IP/OBS MODERATE 55: CPT

## 2021-05-19 PROCEDURE — 99291 CRITICAL CARE FIRST HOUR: CPT

## 2021-05-19 PROCEDURE — 93010 ELECTROCARDIOGRAM REPORT: CPT

## 2021-05-19 PROCEDURE — 71045 X-RAY EXAM CHEST 1 VIEW: CPT | Mod: 26

## 2021-05-19 PROCEDURE — 93970 EXTREMITY STUDY: CPT | Mod: 26

## 2021-05-19 RX ORDER — MAGNESIUM SULFATE 500 MG/ML
2 VIAL (ML) INJECTION ONCE
Refills: 0 | Status: DISCONTINUED | OUTPATIENT
Start: 2021-05-19 | End: 2021-05-19

## 2021-05-19 RX ORDER — METHOCARBAMOL 500 MG/1
500 TABLET, FILM COATED ORAL ONCE
Refills: 0 | Status: COMPLETED | OUTPATIENT
Start: 2021-05-19 | End: 2021-05-19

## 2021-05-19 RX ORDER — CHLORHEXIDINE GLUCONATE 213 G/1000ML
1 SOLUTION TOPICAL
Refills: 0 | Status: DISCONTINUED | OUTPATIENT
Start: 2021-05-19 | End: 2021-05-22

## 2021-05-19 RX ORDER — IPRATROPIUM/ALBUTEROL SULFATE 18-103MCG
3 AEROSOL WITH ADAPTER (GRAM) INHALATION EVERY 6 HOURS
Refills: 0 | Status: DISCONTINUED | OUTPATIENT
Start: 2021-05-19 | End: 2021-05-22

## 2021-05-19 RX ORDER — SODIUM CHLORIDE 9 MG/ML
1000 INJECTION, SOLUTION INTRAVENOUS
Refills: 0 | Status: DISCONTINUED | OUTPATIENT
Start: 2021-05-19 | End: 2021-05-20

## 2021-05-19 RX ORDER — PANTOPRAZOLE SODIUM 20 MG/1
40 TABLET, DELAYED RELEASE ORAL DAILY
Refills: 0 | Status: DISCONTINUED | OUTPATIENT
Start: 2021-05-19 | End: 2021-05-22

## 2021-05-19 RX ORDER — IPRATROPIUM/ALBUTEROL SULFATE 18-103MCG
3 AEROSOL WITH ADAPTER (GRAM) INHALATION ONCE
Refills: 0 | Status: COMPLETED | OUTPATIENT
Start: 2021-05-19 | End: 2021-05-19

## 2021-05-19 RX ORDER — AZITHROMYCIN 500 MG/1
500 TABLET, FILM COATED ORAL EVERY 24 HOURS
Refills: 0 | Status: DISCONTINUED | OUTPATIENT
Start: 2021-05-19 | End: 2021-05-22

## 2021-05-19 RX ORDER — ACETAMINOPHEN 500 MG
650 TABLET ORAL EVERY 6 HOURS
Refills: 0 | Status: DISCONTINUED | OUTPATIENT
Start: 2021-05-19 | End: 2021-05-22

## 2021-05-19 RX ORDER — HEPARIN SODIUM 5000 [USP'U]/ML
5000 INJECTION INTRAVENOUS; SUBCUTANEOUS EVERY 8 HOURS
Refills: 0 | Status: DISCONTINUED | OUTPATIENT
Start: 2021-05-19 | End: 2021-05-22

## 2021-05-19 RX ORDER — MAGNESIUM SULFATE 500 MG/ML
2 VIAL (ML) INJECTION ONCE
Refills: 0 | Status: COMPLETED | OUTPATIENT
Start: 2021-05-19 | End: 2021-05-19

## 2021-05-19 RX ORDER — EPINEPHRINE 0.3 MG/.3ML
0.3 INJECTION INTRAMUSCULAR; SUBCUTANEOUS ONCE
Refills: 0 | Status: COMPLETED | OUTPATIENT
Start: 2021-05-19 | End: 2021-05-19

## 2021-05-19 RX ADMIN — HEPARIN SODIUM 5000 UNIT(S): 5000 INJECTION INTRAVENOUS; SUBCUTANEOUS at 21:46

## 2021-05-19 RX ADMIN — Medication 125 MILLIGRAM(S): at 18:19

## 2021-05-19 RX ADMIN — Medication 3 MILLILITER(S): at 12:16

## 2021-05-19 RX ADMIN — Medication 50 GRAM(S): at 14:40

## 2021-05-19 RX ADMIN — Medication 125 MILLIGRAM(S): at 12:34

## 2021-05-19 RX ADMIN — EPINEPHRINE 0.3 MILLIGRAM(S): 0.3 INJECTION INTRAMUSCULAR; SUBCUTANEOUS at 14:41

## 2021-05-19 RX ADMIN — Medication 3 MILLILITER(S): at 15:00

## 2021-05-19 RX ADMIN — SODIUM CHLORIDE 75 MILLILITER(S): 9 INJECTION, SOLUTION INTRAVENOUS at 18:15

## 2021-05-19 RX ADMIN — Medication 2 GRAM(S): at 14:40

## 2021-05-19 RX ADMIN — AZITHROMYCIN 255 MILLIGRAM(S): 500 TABLET, FILM COATED ORAL at 18:19

## 2021-05-19 RX ADMIN — Medication 3 MILLILITER(S): at 14:11

## 2021-05-19 RX ADMIN — Medication 650 MILLIGRAM(S): at 21:46

## 2021-05-19 RX ADMIN — Medication 3 MILLILITER(S): at 15:35

## 2021-05-19 RX ADMIN — SODIUM CHLORIDE 75 MILLILITER(S): 9 INJECTION, SOLUTION INTRAVENOUS at 22:57

## 2021-05-19 RX ADMIN — Medication 3 MILLILITER(S): at 21:31

## 2021-05-19 RX ADMIN — Medication 650 MILLIGRAM(S): at 22:16

## 2021-05-19 RX ADMIN — Medication 3 MILLILITER(S): at 15:20

## 2021-05-19 NOTE — ED PROVIDER NOTE - PROGRESS NOTE DETAILS
SR: patient received 3 nebs and steroids, continues to be sob with wheezing, will place patient on bipap SR: will move to crit for monitoring.

## 2021-05-19 NOTE — H&P ADULT - NSICDXPASTMEDICALHX_GEN_ALL_CORE_FT
PAST MEDICAL HISTORY:  Asthma     Hyperlipidemia     Hypertension      PAST MEDICAL HISTORY:  Asthma     Hypertension     Obesity     Uterine mass Benign; s/p hysterectomy

## 2021-05-19 NOTE — H&P ADULT - NSHPLABSRESULTS_GEN_ALL_CORE
:  LAB RESULTS:                        14.2   5.82  )-----------( 161      ( 19 May 2021 12:38 )             43.3     141  |  108  |  14  -------------------<  93  3.8   |  18  |  0.8    Ca    9.4          TPro  6.7  /  Alb  4.2  /  TBili  0.4  /  DBili  x   /  AST  21  /  ALT  30  /  AlkPhos  51     MICROBIOLOGY:  None to report    RADIOLOGY:  CXR was unremarkable for acute or chronic process.    ALLERGIES:  No Known Allergies    ===========================================================

## 2021-05-19 NOTE — H&P ADULT - NSHPPHYSICALEXAM_GEN_ALL_CORE
:  VITAL SIGNS: Last 24 Hours  T(C): 36.8 (19 May 2021 11:00), Max: 36.8 (19 May 2021 11:00)  T(F): 98.3 (19 May 2021 11:00), Max: 98.3 (19 May 2021 11:00)  HR: 92 (19 May 2021 14:53) (77 - 92)  BP: 142/97 (19 May 2021 14:53) (142/97 - 183/81)  BP(mean): --  RR: 26 (19 May 2021 14:53) (22 - 26)  SpO2: 100% (19 May 2021 14:53) (96% - 100%)    PHYSICAL EXAM:  GENERAL:   Awake, alert; NAD.  HEENT:  Head NC; Conjunctivae pink, Sclera anicteric; Oral mucosa moist.  CARDIO:   Regular rate; Regular rhythm; S1 & S2.  RESP:   No rales, wheezing, or rhonchi appreciated.  GI:   Soft; NT/ND; BS; No guarding; No rebound tenderness.  MSK/EXT:   Strength UE 5/5; Strength LE 5/5; No edema in LE.  SKIN:   Intact. :  VITAL SIGNS: Last 24 Hours  T(C): 36.8 (19 May 2021 11:00), Max: 36.8 (19 May 2021 11:00)  T(F): 98.3 (19 May 2021 11:00), Max: 98.3 (19 May 2021 11:00)  HR: 92 (19 May 2021 14:53) (77 - 92)  BP: 142/97 (19 May 2021 14:53) (142/97 - 183/81)  BP(mean): --  RR: 26 (19 May 2021 14:53) (22 - 26)  SpO2: 100% (19 May 2021 14:53) (96% - 100%)    PHYSICAL EXAM:  GENERAL:   Awake, alert; NAD on BIPAP.  HEENT:  Head NC; Conjunctivae pink, Sclera anicteric; Oral mucosa dry  CARDIO:   Increased rate; Regular rhythm; S1 & S2.  RESP:   Diffuse wheezing on anterior and posterior lung fields with good, equal entry.   GI:   Soft; NT/ND; BS; Large pannus; No guarding; No rebound tenderness.  MSK/EXT:   Strength UE 5/5; Strength LE 5/5; No edema in LE.

## 2021-05-19 NOTE — ED PROVIDER NOTE - PHYSICAL EXAMINATION
CONSTITUTIONAL: WA / WN / NAD  HEAD: NCAT  EYES: PERRL; EOMI; anicteric.  ENT: Normal pharynx; mucous membranes pink/moist, no erythema.  NECK: Supple; no meningeal signs  CARD: RRR; nl S1/S2; no M/R/G. Pulses equal bilaterally.  RESP: (+) B/L wheezing  ABD: Soft, NT ND nl bowel sounds; no masses; no rebound  MSK/EXT: No gross deformities; full range of motion.  SKIN: Warm and dry;   NEURO: AAOx3, Motor 5/5 x 4 extremities, Sensations intact to pain and palpation, Cerebellar testing normal  PSYCH: Memory Intact, Normal Affect CONSTITUTIONAL: WA / WN / NAD  HEAD: NCAT  EYES: PERRL; EOMI;   ENT: Normal pharynx; mucous membranes pink/moist, no erythema.  NECK: Supple; no meningeal signs  CARD: RRR; nl S1/S2; no M/R/G. Pulses equal bilaterally.  RESP: (+) B/L wheezing  ABD: Soft, NT ND   MSK/EXT: No gross deformities; full range of motion.  SKIN: Warm and dry;   NEURO: AAOx3, Motor 5/5 x 4 extremities, Sensations intact to pain and palpation, Cerebellar testing normal  PSYCH: Memory Intact, Normal Affect CONSTITUTIONAL: WA / WN / NAD  HEAD: NCAT  EYES: PERRL; EOMI;   ENT: Normal pharynx; mucous membranes pink/moist, no erythema.  NECK: Supple; no meningeal signs  CARD: RRR; nl S1/S2; no M/R/G. Pulses equal bilaterally.  RESP: (+) B/L wheezing  ABD: Soft, NT ND   MSK/EXT: No gross deformities; full range of motion.  SKIN: Warm and dry;   NEURO: AAOx3,   PSYCH: Memory Intact, Normal Affect

## 2021-05-19 NOTE — H&P ADULT - NSICDXFAMILYHX_GEN_ALL_CORE_FT
FAMILY HISTORY:  Father  Still living? Unknown  FHx: cardiovascular disease, Age at diagnosis: Age Unknown  FHx: diabetes mellitus, Age at diagnosis: Age Unknown    Mother  Still living? Unknown  FHx: colon cancer, Age at diagnosis: Age Unknown

## 2021-05-19 NOTE — ED ADULT NURSE REASSESSMENT NOTE - NS ED NURSE REASSESS COMMENT FT1
Pt transferred to Critical Care area of ED. Pt placed on BiPap. Pt on Tele/O2 monitor. Bed alarm in place. Iso precautions maintained.

## 2021-05-19 NOTE — ED PROVIDER NOTE - CLINICAL SUMMARY MEDICAL DECISION MAKING FREE TEXT BOX
57 y/o F PMH Asthma, HTN and HLD presents to the ED c/o SOB x2 days. SOB is associated with cough and wheeze. Pt notes that she also started to have to have throat itchiness last night, thought it was her allergies and took 2 Benadryl. Last night Pt also did 3 albuterol treatments, states she was too tired to come to the ED but due to SOB persisting, same to the ED today. VS reviewed. On exam patient with b/l wheezing. Labs imaging obtained and reviewed. 3 nebs steroids given, patient continued to be tachypneic and wheezing, given mag epi & placed on bipap. ICU consult placed admitted to ICU for acute asthma exacerbation.

## 2021-05-19 NOTE — ED PROVIDER NOTE - CARE PLAN
Principal Discharge DX:	Acute respiratory failure  Secondary Diagnosis:	Asthma exacerbation  Secondary Diagnosis:	Shortness of breath

## 2021-05-19 NOTE — H&P ADULT - ASSESSMENT
This is a 56 year old female with a significant PMHx of asthma who is currently admitted to critical care for management of asthma exacerbation.    ASSESSMENT:  Acute respiratory distress secondary to asthma exacerbation  Suspected pregnancy  Hx of Hypertension  Hx of Hyperlipidemia    PLAN:  ·	Start IV Solumedrol 60mg every 12 hours; Transition to PO Prednisone when more stable  ·	Start Duonebs every 6 hours standing with PRN doses for SOB  ·	Start IV Magnesium Sulfate x1 dose given failed first line therapy in ED  ·	Recheck ABG; Will consider holding BIPAP pending results  ·	Follow up Serum Pregnancy test; Avoid contraindicated testing and treatments; Start prenatal vitamin; Will consider OBGYN evaluation if serum pregnancy is positive  ·	Monitor fingers stick daily while on steroids    DVT PPX:   GI PPX: Not indicated  Activity: As tolerated  Diet: DASH    Disposition: Acute with management as above mentioned; Likely discharge to home without needs when medically optimized in 2-3 without the development of course complications.     FULL CODE This is a 56 year old female with a significant PMHx of asthma who is currently admitted to critical care for management of asthma exacerbation.    ASSESSMENT:  Acute respiratory distress secondary to severe asthma exacerbation  ·	Triggers: Seasonal vs. pet (new dog)  ·	No hypoxia or hypercarbia present on admission  ·	Last exacerbation 8 years ago; Does not follow pulmonologist  2.	Suspected obstructive sleep apnea/OHS  ·	High risk STOPBANG  ·	Never tested in the past  3.	Weakly positive pregnancy, urine  ·	Hx of hysterectomy due to mass (benign)  ·	Suspect false positive  4.	Hx of Hypertension  ·	Stopped taking her Nifedipine 60mg PO daily as she reports improved lifestyle    PLAN:  ·	Start IV Solumedrol 125mg every 8 hours; Transition to PO Prednisone when more stable  ·	Start Duonebs every 6 hours standing with PRN doses for SOB  ·	Start IV Magnesium Sulfate x1 dose given failed first line therapy in ED  ·	Recheck ABG; Will consider holding BIPAP with improved respiratory distress; NIPPV at bedtime  ·	Follow up urine drug screen   ·	Follow up BNP and TTE  ·	Follow up TSH  ·	Follow up LE venous duplex US  ·	Follow up Serum Pregnancy test, though urine test is likely a false positive; Avoid contraindicated testing and treatments for now; Will need further workup if serum HCG is positive  ·	Monitor fingers stick daily while on high dose steroids    DVT PPX:   GI PPX: Not indicated  Activity: As tolerated  Diet: DASH    Disposition: Acute with management as above mentioned; Likely discharge to home without needs when medically optimized in 2-3 days without the development of course complications; Will need to be set up with a new PCP and Pulmonologist upon D/C    FULL CODE This is a 56 year old female with a significant PMHx of asthma who is currently admitted to critical care for management of asthma exacerbation.    ASSESSMENT:  Acute respiratory distress secondary to severe asthma exacerbation  ·	Triggers: Seasonal vs. pet (new dog)  ·	No hypoxia or hypercarbia present on admission  ·	Last exacerbation 8 years ago; Does not follow pulmonologist  2.	Suspected obstructive sleep apnea/OHS  ·	High risk STOPBANG  ·	Never tested in the past  3.	Weakly positive pregnancy, urine  ·	Hx of hysterectomy due to mass (benign)  ·	Suspect false positive  4.	Hx of Hypertension  ·	Stopped taking her Nifedipine 60mg PO daily as she reports improved lifestyle    PLAN:  ·	Start IV Solumedrol 125mg every 8 hours; Transition to PO Prednisone when more stable  ·	Start Duonebs every 6 hours standing with PRN doses for SOB  ·	Start IV Magnesium Sulfate x1 dose given failed first line therapy in ED  ·	Recheck ABG; Will consider holding BIPAP with improved respiratory distress; NIPPV at bedtime  ·	Follow up urine drug screen   ·	Follow up BNP and TTE  ·	Follow up TSH  ·	Follow up LE venous duplex US  ·	Follow up Serum Pregnancy test, though urine test is likely a false positive; Avoid contraindicated testing and treatments for now; Will need further workup if serum HCG is positive  ·	Monitor fingers stick daily while on high dose steroids    DVT PPX: Heparin  GI PPX: Not indicated  Activity: As tolerated  Diet: DASH when stable off BIPAP    Disposition: Acute with management as above mentioned; Likely discharge to home without needs when medically optimized in 2-3 days without the development of course complications; Will need to be set up with a new PCP and Pulmonologist upon D/C    FULL CODE This is a 56 year old female with a significant PMHx of asthma who is currently admitted to critical care for management of asthma exacerbation.    ASSESSMENT:  Acute respiratory distress secondary to severe asthma exacerbation  ·	Triggers: Seasonal vs. pet (new dog)  ·	No hypoxia or hypercarbia present on admission  ·	Last exacerbation 8 years ago; Does not follow pulmonologist  2.	Suspected obstructive sleep apnea/OHS  ·	High risk STOPBANG  ·	Never tested in the past  3.	Weakly positive pregnancy, urine  ·	Hx of hysterectomy due to mass (benign)  ·	Suspect false positive  4.	Hx of Hypertension  ·	Stopped taking her Nifedipine 60mg PO daily as she reports improved lifestyle    PLAN:  ·	Start IV Solumedrol 125mg every 8 hours; Transition to PO Prednisone when more stable  ·	Start Duonebs every 6 hours standing with PRN doses for SOB  ·	Start IV Magnesium Sulfate x1 dose given failed first line therapy in ED  ·	Recheck ABG; Will consider holding BIPAP with improved respiratory distress; NIPPV at bedtime  ·	Follow up urine drug screen   ·	Follow up BNP and TTE  ·	Follow up TSH  ·	Follow up LE venous duplex US  ·	Follow up Serum Pregnancy test, though urine test is likely a false positive; Avoid contraindicated testing and treatments for now; Will need further workup if serum HCG is positive  ·	Monitor fingers stick daily while on high dose steroids    DVT PPX: Heparin  GI PPX: Start PPI given high dose steroids  Activity: As tolerated  Diet: DASH when stable off BIPAP    Disposition: Acute with management as above mentioned; Likely discharge to home without needs when medically optimized in 2-3 days without the development of course complications; Will need to be set up with a new PCP and Pulmonologist upon D/C    FULL CODE This is a 56 year old female with a significant PMHx of asthma who is currently admitted to critical care for management of asthma exacerbation.    ASSESSMENT:  Acute respiratory distress secondary to severe asthma exacerbation  ·	Triggers: Seasonal vs. pet (new dog)  ·	No hypoxia or hypercarbia present on admission  ·	Last exacerbation 8 years ago; Does not follow pulmonologist  2.	Suspected obstructive sleep apnea/OHS  ·	High risk STOPBANG  ·	Never tested in the past  3.	Weakly positive pregnancy, urine  ·	Hx of hysterectomy due to mass (benign)  ·	Suspect false positive  4.	Hx of Hypertension  ·	Stopped taking her Nifedipine 60mg PO daily as she reports improved lifestyle    PLAN:  ·	Start IV Solumedrol 125mg every 6 hours; Transition to PO Prednisone when more stable  ·	Start Duonebs every 6 hours standing with PRN doses for SOB  ·	Start IV Magnesium Sulfate x1 dose given failed first line therapy in ED  ·	Recheck ABG; Will consider holding BIPAP with improved respiratory distress; NIPPV at bedtime  ·	Follow up urine drug screen   ·	Follow up BNP and TTE  ·	Follow up TSH  ·	Follow up LE venous duplex US  ·	Follow up Serum Pregnancy test, though urine test is likely a false positive; Avoid contraindicated testing and treatments for now; Will need further workup if serum HCG is positive  ·	Monitor fingers stick daily while on high dose steroids    DVT PPX: Heparin  GI PPX: Start PPI given high dose steroids  Activity: As tolerated  Diet: DASH when stable off BIPAP    Disposition: Acute with management as above mentioned; Likely discharge to home without needs when medically optimized in 2-3 days without the development of course complications; Will need to be set up with a new PCP and Pulmonologist upon D/C    FULL CODE

## 2021-05-19 NOTE — H&P ADULT - HISTORY OF PRESENT ILLNESS
This is a 56 year old female with a significant PMHx of asthma who presented to the ED with a cc/o shortness of breath associated with cough. Her symptoms did not improve with rescue inhaler and subsequently presented to the ED for further evaluation. In the ED, the patient was tachypneic and was placed on BIPAP with improvement in her respiratory distress. She is admitted to critical care for Asthma Exacerbation.  This is a 56 year old female with a significant PMHx of asthma who presented to the ED with a cc/o shortness of breath associated with dry cough x2 days. Her symptoms did not improve with rescue inhaler (albuterol) and subsequently presented to the ED for further evaluation. Her last asthma exacerbation with 8 years ago and does not follow up with a Pulmonologist. ROS is also positive for reports of feeling tired the last few weeks. ROS is otherwise negative. In the ED, the patient was tachypneic and tachycardic, and was placed on BIPAP with improvement in her respiratory distress. She is admitted to critical care for Asthma Exacerbation.

## 2021-05-19 NOTE — ED ADULT NURSE NOTE - OBJECTIVE STATEMENT
Pt presented to ED c/o SOB. Pt h/o asthma, c/o SOB x1 day. Denies n/v/d/fevers/chills. Pt A&Ox4, ambulatory baseline.

## 2021-05-19 NOTE — ED PROVIDER NOTE - NS ED ROS FT
Constitutional: See HPI.  Eyes: No visual changes, eye pain or discharge. No Photophobia  ENMT: No hearing changes, pain, discharge or infections. No neck pain or stiffness. No limited ROM  Cardiac: (+) SOB, No edema. No chest pain with exertion.  Respiratory: (+) cough, (+) wheezing or respiratory distress. No hemoptysis.  GI: No nausea, vomiting, diarrhea or abdominal pain.  : No dysuria, frequency or burning. No Discharge  MS: No myalgia, muscle weakness, joint pain or back pain.  Psych: No suicidal or homicidal ideations.  Neuro: No headache or weakness. No LOC.  Skin: No skin rash. Constitutional: See HPI.  Eyes: No visual changes, eye pain or discharge. No Photophobia  ENMT: No hearing changes, pain, discharge or infections. No neck pain or stiffness. No limited ROM  Cardiac: (+) SOB, No edema. No chest pain with exertion.  Respiratory: (+) cough, (+) wheezing  GI: No nausea, vomiting, diarrhea or abdominal pain.  : No dysuria, frequency or burning.  MS: No myalgia, muscle weakness, joint pain or back pain.  Psych: No suicidal or homicidal ideations.  Neuro: No headache or weakness. No LOC.  Skin: No skin rash.

## 2021-05-19 NOTE — ED ADULT NURSE REASSESSMENT NOTE - NS ED NURSE REASSESS COMMENT FT1
Pt reassessed. No adverse reactions to meds given. See MAR. Pt tolerating BiPAP 12/6 @ 40% Fi02. Pt verbalized improvement with breathing. VS rechecked. VS stable. ICU resident at bedside assessing pt. Plan of care and admission to ICU discussed by MD to pt. Pt verbalized understanding and agreement to plan/admission. HOB maintained at 60 degrees. Emotional support rendered.

## 2021-05-19 NOTE — CONSULT NOTE ADULT - ASSESSMENT
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  Low threshold for intubation    CARDIOVASCULAR: avoid overload, check 2d echo, probnp, LE duplex    GI: GI prophylaxis                                          Feeding NPO for now    RENAL:  F/u  lytes.  Correct as needed. accurate I/O    INFECTIOUS DISEASE: azithromycin for now, check RVP panel    HEMATOLOGICAL:  DVT prophylaxis.    ENDOCRINE:  Follow up FS.  Insulin protocol if needed.    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  Low threshold for intubation    CARDIOVASCULAR: avoid overload, check 2d echo, probnp, LE duplex    GI: GI prophylaxis                                          Feeding NPO for now    RENAL:  F/u  lytes.  Correct as needed. accurate I/O    INFECTIOUS DISEASE: azithromycin for now, check RVP panel    HEMATOLOGICAL:  DVT prophylaxis.  repeat serum preg, US if repeat is positive.    ENDOCRINE:  Follow up FS.  Insulin protocol if needed.    MUSCULOSKELETAL: Bed rest    CODE STATUS: FULL CODE    DISPOSITION: Pt requires continued monitoring in the MICU

## 2021-05-19 NOTE — ED ADULT NURSE NOTE - CHIEF COMPLAINT QUOTE
Pt presented with c/o SOB. Pt states she has been feeling SOB since last night. Hx asthma. States she has not had an exacerbation in years. Denies hx of intubation. Utilized multiple neb tx and albuterol pump with no relief.

## 2021-05-19 NOTE — H&P ADULT - NSHPSOCIALHISTORY_GEN_ALL_CORE
EtOH:  Tobacco:  Illicit drugs: EtOH: Denied  Tobacco: 27+ year smoking 2 packs per day; Quit 8 months prior to this admission 5/2021  Illicit drugs: Denied

## 2021-05-19 NOTE — ED ADULT TRIAGE NOTE - CHIEF COMPLAINT QUOTE
Pt presented with c/o SOB. Pt states she has been feeling SOB since last night. Hx asthma. Utilized neb tx and albuterol pump with no relief. Pt presented with c/o SOB. Pt states she has been feeling SOB since last night. Hx asthma. States she has not had an exacerbation in years. Denies hx of intubation. Utilized multiple neb tx and albuterol pump with no relief.

## 2021-05-19 NOTE — ED PROVIDER NOTE - OBJECTIVE STATEMENT
57 y/o F PMH Asthma, HTN and HLD presents to the ED c/o SOB x2 days. SOB is associated with cough and wheeze. Pt notes that she also started to have to have throat itchiness last night, thought it was her allergies and took 2 Benadryl. Last night Pt also did 3 albuterol treatments, states she was too tired to come to the ED but due to SOB persisting, same to the ED today. Pt denies fevers, chills, n/v/d, or ABD pain. No recent travel or recent surgery. Pt had a recent Duplex done in the ED on May 2nd with no evidence of DVT.

## 2021-05-19 NOTE — CONSULT NOTE ADULT - SUBJECTIVE AND OBJECTIVE BOX
Patient is a 56y old  Female who presents with a chief complaint of Asthma Exacerbation (19 May 2021 15:13)      HPI:  This is a 56 year old female with a significant PMHx of asthma who presented to the ED with a cc/o shortness of breath that started 2 day prior to her presentation. Patient reported recent worsening of her allergies. had episode of asthma exacerbation requiring hospitalization i8 years ago. +ve cough ,no fever, chest pain, N/V/D. Her symptoms did not improve with rescue inhaler and subsequently presented to the ED for further evaluation. In the ED, the patient was tachypneic and was placed on BIPAP with improvement in her respiratory distress.  Patient is admitted to ICU requiring NIV, at high risk of detrioration worsening to require IMV.      PAST MEDICAL & SURGICAL HISTORY:  Asthma    Hypertension    Hyperlipidemia        SOCIAL HX:   Smoking  Former smoker(quit 8 months ago)                      ETOH     -ve                       Other  -ve    FAMILY HISTORY:  :  No known cardiovacular family hisotry     ROS:  See HPI     Allergies    No Known Allergies    Intolerances          PHYSICAL EXAM    ICU Vital Signs Last 24 Hrs  T(C): 36.8 (19 May 2021 11:00), Max: 36.8 (19 May 2021 11:00)  T(F): 98.3 (19 May 2021 11:00), Max: 98.3 (19 May 2021 11:00)  HR: 92 (19 May 2021 14:53) (77 - 92)  BP: 142/97 (19 May 2021 14:53) (142/97 - 183/81)  RR: 26 (19 May 2021 14:53) (22 - 26)  SpO2: 100% (19 May 2021 14:53) (96% - 100%)      General: in respiratory distress on NIV(feels better), speaks in 3-4 words sent.  HEENT:  MEGGAN              Lungs: Diffuse  wheezing  Cardiovascular: Regular  Gastrointestinal: Soft, Positive BS  Musculoskeletal: No clubbing.  Moves all extremities.  Full range of motion   Skin: Warm.  Intact  Neurological: No motor or sensory deficit         LABS:                          14.2   5.82  )-----------( 161      ( 19 May 2021 12:38 )             43.3                                               05-19    141  |  108  |  14  ----------------------------<  93  3.8   |  18  |  0.8    Ca    9.4      19 May 2021 12:38    TPro  6.7  /  Alb  4.2  /  TBili  0.4  /  DBili  x   /  AST  21  /  ALT  30  /  AlkPhos  51  05-19                                                                                           LIVER FUNCTIONS - ( 19 May 2021 12:38 )  Alb: 4.2 g/dL / Pro: 6.7 g/dL / ALK PHOS: 51 U/L / ALT: 30 U/L / AST: 21 U/L / GGT: x                                                                                                                                       X-Rays                                                                                     ECHO    MEDICATIONS  (STANDING):  albuterol/ipratropium for Nebulization 3 milliLiter(s) Nebulizer every 6 hours  albuterol/ipratropium for Nebulization. 3 milliLiter(s) Nebulizer once  albuterol/ipratropium for Nebulization. 3 milliLiter(s) Nebulizer once  chlorhexidine 4% Liquid 1 Application(s) Topical <User Schedule>  prenatal multivitamin 1 Tablet(s) Oral daily    MEDICATIONS  (PRN):

## 2021-05-20 LAB
A1C WITH ESTIMATED AVERAGE GLUCOSE RESULT: 5.6 % — SIGNIFICANT CHANGE UP (ref 4–5.6)
ANION GAP SERPL CALC-SCNC: 14 MMOL/L — SIGNIFICANT CHANGE UP (ref 7–14)
BASOPHILS # BLD AUTO: 0.01 K/UL — SIGNIFICANT CHANGE UP (ref 0–0.2)
BASOPHILS NFR BLD AUTO: 0.1 % — SIGNIFICANT CHANGE UP (ref 0–1)
BUN SERPL-MCNC: 14 MG/DL — SIGNIFICANT CHANGE UP (ref 10–20)
CALCIUM SERPL-MCNC: 9.3 MG/DL — SIGNIFICANT CHANGE UP (ref 8.5–10.1)
CHLORIDE SERPL-SCNC: 107 MMOL/L — SIGNIFICANT CHANGE UP (ref 98–110)
CHOLEST SERPL-MCNC: 213 MG/DL — HIGH
CO2 SERPL-SCNC: 18 MMOL/L — SIGNIFICANT CHANGE UP (ref 17–32)
COVID-19 SPIKE DOMAIN AB INTERP: POSITIVE
COVID-19 SPIKE DOMAIN ANTIBODY RESULT: >250 U/ML — HIGH
CREAT SERPL-MCNC: 0.7 MG/DL — SIGNIFICANT CHANGE UP (ref 0.7–1.5)
D DIMER BLD IA.RAPID-MCNC: 144 NG/ML DDU — SIGNIFICANT CHANGE UP (ref 0–230)
EOSINOPHIL # BLD AUTO: 0 K/UL — SIGNIFICANT CHANGE UP (ref 0–0.7)
EOSINOPHIL NFR BLD AUTO: 0 % — SIGNIFICANT CHANGE UP (ref 0–8)
ESTIMATED AVERAGE GLUCOSE: 114 MG/DL — SIGNIFICANT CHANGE UP (ref 68–114)
GLUCOSE SERPL-MCNC: 119 MG/DL — HIGH (ref 70–99)
HCT VFR BLD CALC: 44.6 % — SIGNIFICANT CHANGE UP (ref 37–47)
HDLC SERPL-MCNC: 71 MG/DL — SIGNIFICANT CHANGE UP
HGB BLD-MCNC: 14.7 G/DL — SIGNIFICANT CHANGE UP (ref 12–16)
IMM GRANULOCYTES NFR BLD AUTO: 0.7 % — HIGH (ref 0.1–0.3)
LACTATE SERPL-SCNC: 2.1 MMOL/L — HIGH (ref 0.7–2)
LACTATE SERPL-SCNC: 2.9 MMOL/L — HIGH (ref 0.7–2)
LIPID PNL WITH DIRECT LDL SERPL: 135 MG/DL — HIGH
LYMPHOCYTES # BLD AUTO: 0.76 K/UL — LOW (ref 1.2–3.4)
LYMPHOCYTES # BLD AUTO: 7.7 % — LOW (ref 20.5–51.1)
MAGNESIUM SERPL-MCNC: 1.9 MG/DL — SIGNIFICANT CHANGE UP (ref 1.8–2.4)
MCHC RBC-ENTMCNC: 29.6 PG — SIGNIFICANT CHANGE UP (ref 27–31)
MCHC RBC-ENTMCNC: 33 G/DL — SIGNIFICANT CHANGE UP (ref 32–37)
MCV RBC AUTO: 89.7 FL — SIGNIFICANT CHANGE UP (ref 81–99)
MONOCYTES # BLD AUTO: 0.19 K/UL — SIGNIFICANT CHANGE UP (ref 0.1–0.6)
MONOCYTES NFR BLD AUTO: 1.9 % — SIGNIFICANT CHANGE UP (ref 1.7–9.3)
NEUTROPHILS # BLD AUTO: 8.86 K/UL — HIGH (ref 1.4–6.5)
NEUTROPHILS NFR BLD AUTO: 89.6 % — HIGH (ref 42.2–75.2)
NON HDL CHOLESTEROL: 142 MG/DL — HIGH
NRBC # BLD: 0 /100 WBCS — SIGNIFICANT CHANGE UP (ref 0–0)
NT-PROBNP SERPL-SCNC: 347 PG/ML — HIGH (ref 0–300)
PHOSPHATE SERPL-MCNC: 3.4 MG/DL — SIGNIFICANT CHANGE UP (ref 2.1–4.9)
PLATELET # BLD AUTO: 182 K/UL — SIGNIFICANT CHANGE UP (ref 130–400)
POTASSIUM SERPL-MCNC: 3.8 MMOL/L — SIGNIFICANT CHANGE UP (ref 3.5–5)
POTASSIUM SERPL-SCNC: 3.8 MMOL/L — SIGNIFICANT CHANGE UP (ref 3.5–5)
RAPID RVP RESULT: DETECTED
RBC # BLD: 4.97 M/UL — SIGNIFICANT CHANGE UP (ref 4.2–5.4)
RBC # FLD: 12.9 % — SIGNIFICANT CHANGE UP (ref 11.5–14.5)
RV+EV RNA SPEC QL NAA+PROBE: DETECTED
SARS-COV-2 IGG+IGM SERPL QL IA: >250 U/ML — HIGH
SARS-COV-2 IGG+IGM SERPL QL IA: POSITIVE
SARS-COV-2 RNA SPEC QL NAA+PROBE: SIGNIFICANT CHANGE UP
SODIUM SERPL-SCNC: 139 MMOL/L — SIGNIFICANT CHANGE UP (ref 135–146)
TRIGL SERPL-MCNC: 55 MG/DL — SIGNIFICANT CHANGE UP
TROPONIN T SERPL-MCNC: <0.01 NG/ML — SIGNIFICANT CHANGE UP
TSH SERPL-MCNC: 0.47 UIU/ML — SIGNIFICANT CHANGE UP (ref 0.27–4.2)
WBC # BLD: 9.89 K/UL — SIGNIFICANT CHANGE UP (ref 4.8–10.8)
WBC # FLD AUTO: 9.89 K/UL — SIGNIFICANT CHANGE UP (ref 4.8–10.8)

## 2021-05-20 PROCEDURE — 99233 SBSQ HOSP IP/OBS HIGH 50: CPT

## 2021-05-20 RX ORDER — IBUPROFEN 200 MG
600 TABLET ORAL ONCE
Refills: 0 | Status: COMPLETED | OUTPATIENT
Start: 2021-05-20 | End: 2021-05-20

## 2021-05-20 RX ORDER — SODIUM CHLORIDE 0.65 %
1 AEROSOL, SPRAY (ML) NASAL
Refills: 0 | Status: DISCONTINUED | OUTPATIENT
Start: 2021-05-20 | End: 2021-05-22

## 2021-05-20 RX ORDER — IBUPROFEN 200 MG
400 TABLET ORAL EVERY 6 HOURS
Refills: 0 | Status: DISCONTINUED | OUTPATIENT
Start: 2021-05-20 | End: 2021-05-22

## 2021-05-20 RX ORDER — DIPHENHYDRAMINE HCL 50 MG
50 CAPSULE ORAL ONCE
Refills: 0 | Status: COMPLETED | OUTPATIENT
Start: 2021-05-20 | End: 2021-05-20

## 2021-05-20 RX ORDER — GUAIFENESIN/DEXTROMETHORPHAN 600MG-30MG
10 TABLET, EXTENDED RELEASE 12 HR ORAL EVERY 4 HOURS
Refills: 0 | Status: DISCONTINUED | OUTPATIENT
Start: 2021-05-20 | End: 2021-05-22

## 2021-05-20 RX ORDER — BUDESONIDE AND FORMOTEROL FUMARATE DIHYDRATE 160; 4.5 UG/1; UG/1
2 AEROSOL RESPIRATORY (INHALATION)
Refills: 0 | Status: DISCONTINUED | OUTPATIENT
Start: 2021-05-20 | End: 2021-05-22

## 2021-05-20 RX ADMIN — Medication 600 MILLIGRAM(S): at 13:30

## 2021-05-20 RX ADMIN — METHOCARBAMOL 500 MILLIGRAM(S): 500 TABLET, FILM COATED ORAL at 00:43

## 2021-05-20 RX ADMIN — Medication 50 MILLIGRAM(S): at 17:04

## 2021-05-20 RX ADMIN — Medication 60 MILLIGRAM(S): at 21:01

## 2021-05-20 RX ADMIN — Medication 125 MILLIGRAM(S): at 06:32

## 2021-05-20 RX ADMIN — Medication 60 MILLIGRAM(S): at 14:49

## 2021-05-20 RX ADMIN — HEPARIN SODIUM 5000 UNIT(S): 5000 INJECTION INTRAVENOUS; SUBCUTANEOUS at 21:01

## 2021-05-20 RX ADMIN — Medication 3 MILLILITER(S): at 13:12

## 2021-05-20 RX ADMIN — Medication 1 SPRAY(S): at 18:41

## 2021-05-20 RX ADMIN — AZITHROMYCIN 255 MILLIGRAM(S): 500 TABLET, FILM COATED ORAL at 17:05

## 2021-05-20 RX ADMIN — Medication 3 MILLILITER(S): at 06:16

## 2021-05-20 RX ADMIN — Medication 600 MILLIGRAM(S): at 13:26

## 2021-05-20 RX ADMIN — Medication 125 MILLIGRAM(S): at 00:43

## 2021-05-20 RX ADMIN — Medication 650 MILLIGRAM(S): at 11:52

## 2021-05-20 RX ADMIN — Medication 10 MILLILITER(S): at 21:21

## 2021-05-20 RX ADMIN — Medication 3 MILLILITER(S): at 20:32

## 2021-05-20 RX ADMIN — HEPARIN SODIUM 5000 UNIT(S): 5000 INJECTION INTRAVENOUS; SUBCUTANEOUS at 14:49

## 2021-05-20 RX ADMIN — PANTOPRAZOLE SODIUM 40 MILLIGRAM(S): 20 TABLET, DELAYED RELEASE ORAL at 11:52

## 2021-05-20 RX ADMIN — Medication 400 MILLIGRAM(S): at 23:57

## 2021-05-20 RX ADMIN — HEPARIN SODIUM 5000 UNIT(S): 5000 INJECTION INTRAVENOUS; SUBCUTANEOUS at 06:31

## 2021-05-20 RX ADMIN — Medication 650 MILLIGRAM(S): at 12:30

## 2021-05-20 RX ADMIN — BUDESONIDE AND FORMOTEROL FUMARATE DIHYDRATE 2 PUFF(S): 160; 4.5 AEROSOL RESPIRATORY (INHALATION) at 21:02

## 2021-05-20 NOTE — PROGRESS NOTE ADULT - ASSESSMENT
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 NC    CARDIOVASCULAR: Keep balance    GI: GI prophylaxis                                          Feeding po    RENAL:  F/u  lytes.  Correct as needed. accurate I/O    INFECTIOUS DISEASE: azithromycin for now, check RVP panel    HEMATOLOGICAL:  DVT prophylaxis. LE doppler    ENDOCRINE:  Follow up FS.  Insulin protocol if needed.    MUSCULOSKELETAL: Bed rest    CODE STATUS: FULL CODE    DISPOSITION: Pt requires continued monitoring in the MICU     Assessment/plan:  #Severe Asthma/COPD exacerbation (improved)  - Continue solumedrol 60mg q8h, scheduled duonebs, symbicort, and azithromycin  - Continue 2L NC/wean as tolerated. Patient satting 99% on room air PM of 5/20  - LE duplex/dimer negative  - Patient has h/o near fatal asthma exacerbation 8 years ago, no intubation  - Smoker for 27yrs, quit 8mo. ago  - Outpatient PFT to evaluate for COPD  - Check RVP panel    #Obesity  - PO diet  - Dietary counseling when more stable    #Probable NEIL  - Outpatient evaluation    CODE STATUS: FULL CODE    DISPOSITION: Stepdown.

## 2021-05-20 NOTE — PROGRESS NOTE ADULT - ASSESSMENT
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 NC    CARDIOVASCULAR: Keep balance    GI: GI prophylaxis                                          Feeding po    RENAL:  F/u  lytes.  Correct as needed. accurate I/O    INFECTIOUS DISEASE: azithromycin for now, check RVP panel    HEMATOLOGICAL:  DVT prophylaxis. LE doppler    ENDOCRINE:  Follow up FS.  Insulin protocol if needed.    MUSCULOSKELETAL: Bed rest    CODE STATUS: FULL CODE    DISPOSITION: Pt requires continued monitoring in the MICU

## 2021-05-20 NOTE — CHART NOTE - NSCHARTNOTEFT_GEN_A_CORE
History of Present Illness:   This is a 56 year old female with a significant PMHx of asthma who presented to the ED with a cc/o shortness of breath associated with dry cough x2 days. Her symptoms did not improve with rescue inhaler (albuterol) and subsequently presented to the ED for further evaluation. Her last asthma exacerbation with 8 years ago and does not follow up with a Pulmonologist. ROS is also positive for reports of feeling tired the last few weeks. ROS is otherwise negative. In the ED, the patient was tachypneic and tachycardic, and was placed on BIPAP with improvement in her respiratory distress. She is admitted to critical care for Asthma Exacerbation.     ICU course:  Patient started on solumedrol, scheduled duonebs, and azithromycin. Reported improvement in SOB in AM of 5/20, satting well on 2L.       Assessment/plan:  #Severe Asthma/COPD exacerbation (improved)  - Continue solumedrol 60mg q8h, scheduled duonebs, symbicort, and azithromycin  - Continue 2L NC/wean as tolerated. Patient satting 99% on room air PM of 5/20  - LE duplex/dimer negative  - Patient has h/o near fatal asthma exacerbation 8 years ago, no intubation  - Smoker for 27yrs, quit 8mo. ago  - Outpatient PFT to evaluate for COPD  - Check RVP panel    #Obesity  - PO diet  - Dietary counseling when more stable    #Probable NEIL  - Outpatient evaluation    CODE STATUS: FULL CODE    DISPOSITION: Stepdown

## 2021-05-21 LAB
ALBUMIN SERPL ELPH-MCNC: 3.5 G/DL — SIGNIFICANT CHANGE UP (ref 3.5–5.2)
ALP SERPL-CCNC: 73 U/L — SIGNIFICANT CHANGE UP (ref 30–115)
ALT FLD-CCNC: 22 U/L — SIGNIFICANT CHANGE UP (ref 0–41)
ANION GAP SERPL CALC-SCNC: 10 MMOL/L — SIGNIFICANT CHANGE UP (ref 7–14)
AST SERPL-CCNC: 13 U/L — SIGNIFICANT CHANGE UP (ref 0–41)
BASOPHILS # BLD AUTO: 0.02 K/UL — SIGNIFICANT CHANGE UP (ref 0–0.2)
BASOPHILS NFR BLD AUTO: 0.1 % — SIGNIFICANT CHANGE UP (ref 0–1)
BILIRUB SERPL-MCNC: <0.2 MG/DL — SIGNIFICANT CHANGE UP (ref 0.2–1.2)
BUN SERPL-MCNC: 22 MG/DL — HIGH (ref 10–20)
CALCIUM SERPL-MCNC: 8.9 MG/DL — SIGNIFICANT CHANGE UP (ref 8.5–10.1)
CHLORIDE SERPL-SCNC: 112 MMOL/L — HIGH (ref 98–110)
CO2 SERPL-SCNC: 19 MMOL/L — SIGNIFICANT CHANGE UP (ref 17–32)
CREAT SERPL-MCNC: 0.7 MG/DL — SIGNIFICANT CHANGE UP (ref 0.7–1.5)
EOSINOPHIL # BLD AUTO: 0 K/UL — SIGNIFICANT CHANGE UP (ref 0–0.7)
EOSINOPHIL NFR BLD AUTO: 0 % — SIGNIFICANT CHANGE UP (ref 0–8)
GLUCOSE BLDC GLUCOMTR-MCNC: 123 MG/DL — HIGH (ref 70–99)
GLUCOSE BLDC GLUCOMTR-MCNC: 133 MG/DL — HIGH (ref 70–99)
GLUCOSE BLDC GLUCOMTR-MCNC: 171 MG/DL — HIGH (ref 70–99)
GLUCOSE SERPL-MCNC: 206 MG/DL — HIGH (ref 70–99)
HCT VFR BLD CALC: 42.8 % — SIGNIFICANT CHANGE UP (ref 37–47)
HGB BLD-MCNC: 13.5 G/DL — SIGNIFICANT CHANGE UP (ref 12–16)
IMM GRANULOCYTES NFR BLD AUTO: 1.1 % — HIGH (ref 0.1–0.3)
LYMPHOCYTES # BLD AUTO: 0.76 K/UL — LOW (ref 1.2–3.4)
LYMPHOCYTES # BLD AUTO: 4.6 % — LOW (ref 20.5–51.1)
MCHC RBC-ENTMCNC: 28.8 PG — SIGNIFICANT CHANGE UP (ref 27–31)
MCHC RBC-ENTMCNC: 31.5 G/DL — LOW (ref 32–37)
MCV RBC AUTO: 91.5 FL — SIGNIFICANT CHANGE UP (ref 81–99)
MONOCYTES # BLD AUTO: 0.96 K/UL — HIGH (ref 0.1–0.6)
MONOCYTES NFR BLD AUTO: 5.8 % — SIGNIFICANT CHANGE UP (ref 1.7–9.3)
NEUTROPHILS # BLD AUTO: 14.74 K/UL — HIGH (ref 1.4–6.5)
NEUTROPHILS NFR BLD AUTO: 88.4 % — HIGH (ref 42.2–75.2)
NRBC # BLD: 0 /100 WBCS — SIGNIFICANT CHANGE UP (ref 0–0)
PLATELET # BLD AUTO: 196 K/UL — SIGNIFICANT CHANGE UP (ref 130–400)
POTASSIUM SERPL-MCNC: 3.9 MMOL/L — SIGNIFICANT CHANGE UP (ref 3.5–5)
POTASSIUM SERPL-SCNC: 3.9 MMOL/L — SIGNIFICANT CHANGE UP (ref 3.5–5)
PROT SERPL-MCNC: 6 G/DL — SIGNIFICANT CHANGE UP (ref 6–8)
RBC # BLD: 4.68 M/UL — SIGNIFICANT CHANGE UP (ref 4.2–5.4)
RBC # FLD: 13.4 % — SIGNIFICANT CHANGE UP (ref 11.5–14.5)
SODIUM SERPL-SCNC: 141 MMOL/L — SIGNIFICANT CHANGE UP (ref 135–146)
WBC # BLD: 16.66 K/UL — HIGH (ref 4.8–10.8)
WBC # FLD AUTO: 16.66 K/UL — HIGH (ref 4.8–10.8)

## 2021-05-21 PROCEDURE — 99232 SBSQ HOSP IP/OBS MODERATE 35: CPT | Mod: GC

## 2021-05-21 PROCEDURE — 76856 US EXAM PELVIC COMPLETE: CPT | Mod: 26

## 2021-05-21 PROCEDURE — 71045 X-RAY EXAM CHEST 1 VIEW: CPT | Mod: 26

## 2021-05-21 RX ORDER — DIPHENHYDRAMINE HCL 50 MG
25 CAPSULE ORAL EVERY 4 HOURS
Refills: 0 | Status: DISCONTINUED | OUTPATIENT
Start: 2021-05-21 | End: 2021-05-22

## 2021-05-21 RX ADMIN — Medication 400 MILLIGRAM(S): at 13:10

## 2021-05-21 RX ADMIN — Medication 10 MILLILITER(S): at 17:18

## 2021-05-21 RX ADMIN — AZITHROMYCIN 255 MILLIGRAM(S): 500 TABLET, FILM COATED ORAL at 17:18

## 2021-05-21 RX ADMIN — Medication 60 MILLIGRAM(S): at 07:00

## 2021-05-21 RX ADMIN — HEPARIN SODIUM 5000 UNIT(S): 5000 INJECTION INTRAVENOUS; SUBCUTANEOUS at 07:00

## 2021-05-21 RX ADMIN — Medication 10 MILLILITER(S): at 09:21

## 2021-05-21 RX ADMIN — Medication 400 MILLIGRAM(S): at 22:48

## 2021-05-21 RX ADMIN — CHLORHEXIDINE GLUCONATE 1 APPLICATION(S): 213 SOLUTION TOPICAL at 07:01

## 2021-05-21 RX ADMIN — HEPARIN SODIUM 5000 UNIT(S): 5000 INJECTION INTRAVENOUS; SUBCUTANEOUS at 13:10

## 2021-05-21 RX ADMIN — Medication 10 MILLILITER(S): at 21:36

## 2021-05-21 RX ADMIN — Medication 3 MILLILITER(S): at 08:10

## 2021-05-21 RX ADMIN — HEPARIN SODIUM 5000 UNIT(S): 5000 INJECTION INTRAVENOUS; SUBCUTANEOUS at 21:36

## 2021-05-21 RX ADMIN — PANTOPRAZOLE SODIUM 40 MILLIGRAM(S): 20 TABLET, DELAYED RELEASE ORAL at 13:10

## 2021-05-21 RX ADMIN — Medication 3 MILLILITER(S): at 20:57

## 2021-05-21 RX ADMIN — Medication 400 MILLIGRAM(S): at 13:48

## 2021-05-21 RX ADMIN — Medication 3 MILLILITER(S): at 15:32

## 2021-05-21 RX ADMIN — Medication 3 MILLILITER(S): at 01:49

## 2021-05-21 RX ADMIN — Medication 10 MILLILITER(S): at 13:10

## 2021-05-21 RX ADMIN — Medication 60 MILLIGRAM(S): at 17:19

## 2021-05-21 RX ADMIN — Medication 25 MILLIGRAM(S): at 21:35

## 2021-05-21 RX ADMIN — Medication 400 MILLIGRAM(S): at 00:30

## 2021-05-21 RX ADMIN — Medication 10 MILLILITER(S): at 07:01

## 2021-05-21 RX ADMIN — Medication 10 MILLILITER(S): at 07:00

## 2021-05-21 RX ADMIN — BUDESONIDE AND FORMOTEROL FUMARATE DIHYDRATE 2 PUFF(S): 160; 4.5 AEROSOL RESPIRATORY (INHALATION) at 09:22

## 2021-05-21 NOTE — PROGRESS NOTE ADULT - ATTENDING COMMENTS
Impression:    COPD ACO exacerbation improving  Rhino/entero virus   Obesity  Probable NEIL    Plan as outlined above

## 2021-05-21 NOTE — PROGRESS NOTE ADULT - ASSESSMENT
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 NC    US of the chest.    CARDIOVASCULAR:  I<=O, check 2d echo    GI: GI prophylaxis                                          Feeding po    RENAL:  F/u  lytes.  Correct as needed. accurate I/O    INFECTIOUS DISEASE: azithromycin x5 days    HEMATOLOGICAL:  DVT prophylaxis. LE doppler    ENDOCRINE:  Follow up FS.  Insulin protocol if needed.    MUSCULOSKELETAL: Bed rest    CODE STATUS: FULL CODE    DISPOSITION: downgrade to medical floor. Impression:    COPD ACO exacerbation improving  Rhino/entero virus   Obesity  Probable NEIL      PLAN:    CNS: Avoid CNS depressants    HEENT:  Oral care    PULMONARY:  HOB @ 45 degrees.  Decrease Solumdrol to 60 q 12,  nebs q4hrs and prn,  Symbicort.  CXR PA and Lateral     CARDIOVASCULAR:  I<=O, check 2d echo    GI: GI prophylaxis                                          Feeding po    RENAL:  F/u  lytes.  Correct as needed. accurate I/O    INFECTIOUS DISEASE: azithromycin x5 days    HEMATOLOGICAL:  DVT prophylaxis. LE doppler    ENDOCRINE:  Follow up FS.  Insulin protocol if needed.    MUSCULOSKELETAL: Bed rest    CODE STATUS: FULL CODE    DISPOSITION: downgrade to medical floor.

## 2021-05-21 NOTE — CHART NOTE - NSCHARTNOTEFT_GEN_A_CORE
VENT Transfer Note    Transfer from: [facility]  Transfer to:  ( x ) Medicine    (  ) Telemetry    (  ) RCU    (  ) Palliative    (  ) Stroke Unit    (  ) _______________    MEDICATIONS:  STANDING MEDICATIONS  albuterol/ipratropium for Nebulization 3 milliLiter(s) Nebulizer every 6 hours  azithromycin  IVPB 500 milliGRAM(s) IV Intermittent every 24 hours  budesonide 160 MICROgram(s)/formoterol 4.5 MICROgram(s) Inhaler 2 Puff(s) Inhalation two times a day  chlorhexidine 4% Liquid 1 Application(s) Topical <User Schedule>  guaifenesin/dextromethorphan Oral Liquid 10 milliLiter(s) Oral every 4 hours  heparin   Injectable 5000 Unit(s) SubCutaneous every 8 hours  methylPREDNISolone sodium succinate Injectable 60 milliGRAM(s) IV Push every 12 hours  pantoprazole    Tablet 40 milliGRAM(s) Oral daily    PRN MEDICATIONS  acetaminophen   Tablet .. 650 milliGRAM(s) Oral every 6 hours PRN  ibuprofen  Tablet. 400 milliGRAM(s) Oral every 6 hours PRN  sodium chloride 0.65% Nasal 1 Spray(s) Both Nostrils two times a day PRN      VITAL SIGNS: Last 24 Hours  T(C): 36.7 (21 May 2021 08:25), Max: 37.1 (20 May 2021 23:30)  T(F): 98 (21 May 2021 08:25), Max: 98.7 (20 May 2021 23:30)  HR: 88 (21 May 2021 08:25) (88 - 114)  BP: 147/83 (21 May 2021 08:25) (117/67 - 180/86)  BP(mean): 98 (21 May 2021 08:25) (86 - 132)  RR: 20 (21 May 2021 08:25) (18 - 47)  SpO2: 97% (21 May 2021 04:00) (97% - 99%)    LABS:                        13.5   16.66 )-----------( 196      ( 21 May 2021 06:14 )             42.8     05-21    141  |  112<H>  |  22<H>  ----------------------------<  206<H>  3.9   |  19  |  0.7    Ca    8.9      21 May 2021 06:14  Phos  3.4     05-20  Mg     1.9     05-20    TPro  6.0  /  Alb  3.5  /  TBili  <0.2  /  DBili  x   /  AST  13  /  ALT  22  /  AlkPhos  73  05-21      ABG - ( 19 May 2021 16:44 )  pH, Arterial: 7.35  pH, Blood: x     /  pCO2: 39    /  pO2: 156   / HCO3: 21    / Base Excess: -4.0  /  SaO2: 99          CARDIAC MARKERS ( 20 May 2021 12:11 )  x     / <0.01 ng/mL / x     / x     / x          RADIOLOGY:    CXR 5/21:   Lung volumes with bilateral interstitial thickening and blunting of the left costophrenic angle.      VENT COURSE:  Patient sat'ing 100% this AM on room air, not in any respiratory distress. Started on azithromycin, duonebs, solumedrol in ED with significant improvement of asthma exacerbation. RVP panel + for rhino/enterovirus.     On CXR 5/21, pt found to have blunted L costophrenic angle concerning for possible cardiac etiology so ECHO ordered to assess HF. Additionally patient was noted to have both + urine hCG and + serum hCG, concerning for possible tumor, pending pelvic ultrasound.       ASSESSMENT & PLAN:     # Severe Asthma/COPD exacerbation (improved)  - tapered to solumedrol 60mg q12h, PRN duonebs, symbicort, and azithromycin  - Weaned to room air sat'ign 100% this AM   - LE duplex/dimer negative  - Patient has h/o near fatal asthma exacerbation 8 years ago, no intubation  - Smoker for 27yrs, quit 8mo. ago  - Outpatient PFT to evaluate for COPD  - RVP panel + for rhino/enterovirus  - d/c tomorrow if stable on prednisone taper for 10 days total    # L costophrenic angle blunting  - CXR 5/21 L costophrenic angle blunting  - f/u ECHO ---> will require sleep study if ECHO findings in line with NEIL    #Obesity  - PO diet  - Dietary counseling prior to dc    #Probable NEIL  - Outpatient evaluation    CODE STATUS: FULL CODE    DISPOSITION: Floor    For Follow-up:  [ ] ECHO  [ ] US Pelvis   [ ] CXR AM

## 2021-05-22 ENCOUNTER — TRANSCRIPTION ENCOUNTER (OUTPATIENT)
Age: 56
End: 2021-05-22

## 2021-05-22 VITALS
SYSTOLIC BLOOD PRESSURE: 153 MMHG | HEART RATE: 88 BPM | DIASTOLIC BLOOD PRESSURE: 82 MMHG | TEMPERATURE: 97 F | OXYGEN SATURATION: 96 %

## 2021-05-22 LAB
ALBUMIN SERPL ELPH-MCNC: 3.8 G/DL — SIGNIFICANT CHANGE UP (ref 3.5–5.2)
ALP SERPL-CCNC: 60 U/L — SIGNIFICANT CHANGE UP (ref 30–115)
ALT FLD-CCNC: 20 U/L — SIGNIFICANT CHANGE UP (ref 0–41)
AMPHET UR-MCNC: NEGATIVE — SIGNIFICANT CHANGE UP
ANION GAP SERPL CALC-SCNC: 12 MMOL/L — SIGNIFICANT CHANGE UP (ref 7–14)
AST SERPL-CCNC: 14 U/L — SIGNIFICANT CHANGE UP (ref 0–41)
BARBITURATES UR SCN-MCNC: NEGATIVE — SIGNIFICANT CHANGE UP
BASOPHILS # BLD AUTO: 0.03 K/UL — SIGNIFICANT CHANGE UP (ref 0–0.2)
BASOPHILS NFR BLD AUTO: 0.2 % — SIGNIFICANT CHANGE UP (ref 0–1)
BENZODIAZ UR-MCNC: NEGATIVE — SIGNIFICANT CHANGE UP
BILIRUB SERPL-MCNC: <0.2 MG/DL — SIGNIFICANT CHANGE UP (ref 0.2–1.2)
BUN SERPL-MCNC: 19 MG/DL — SIGNIFICANT CHANGE UP (ref 10–20)
CALCIUM SERPL-MCNC: 8.8 MG/DL — SIGNIFICANT CHANGE UP (ref 8.5–10.1)
CHLORIDE SERPL-SCNC: 107 MMOL/L — SIGNIFICANT CHANGE UP (ref 98–110)
CO2 SERPL-SCNC: 21 MMOL/L — SIGNIFICANT CHANGE UP (ref 17–32)
COCAINE METAB.OTHER UR-MCNC: NEGATIVE — SIGNIFICANT CHANGE UP
CREAT SERPL-MCNC: 0.8 MG/DL — SIGNIFICANT CHANGE UP (ref 0.7–1.5)
EOSINOPHIL # BLD AUTO: 0 K/UL — SIGNIFICANT CHANGE UP (ref 0–0.7)
EOSINOPHIL NFR BLD AUTO: 0 % — SIGNIFICANT CHANGE UP (ref 0–8)
GLUCOSE BLDC GLUCOMTR-MCNC: 119 MG/DL — HIGH (ref 70–99)
GLUCOSE BLDC GLUCOMTR-MCNC: 147 MG/DL — HIGH (ref 70–99)
GLUCOSE SERPL-MCNC: 111 MG/DL — HIGH (ref 70–99)
HCT VFR BLD CALC: 41.6 % — SIGNIFICANT CHANGE UP (ref 37–47)
HGB BLD-MCNC: 13.7 G/DL — SIGNIFICANT CHANGE UP (ref 12–16)
IMM GRANULOCYTES NFR BLD AUTO: 2.5 % — HIGH (ref 0.1–0.3)
LYMPHOCYTES # BLD AUTO: 1.29 K/UL — SIGNIFICANT CHANGE UP (ref 1.2–3.4)
LYMPHOCYTES # BLD AUTO: 8.2 % — LOW (ref 20.5–51.1)
MAGNESIUM SERPL-MCNC: 2 MG/DL — SIGNIFICANT CHANGE UP (ref 1.8–2.4)
MCHC RBC-ENTMCNC: 29.7 PG — SIGNIFICANT CHANGE UP (ref 27–31)
MCHC RBC-ENTMCNC: 32.9 G/DL — SIGNIFICANT CHANGE UP (ref 32–37)
MCV RBC AUTO: 90 FL — SIGNIFICANT CHANGE UP (ref 81–99)
METHADONE UR-MCNC: NEGATIVE — SIGNIFICANT CHANGE UP
MONOCYTES # BLD AUTO: 0.89 K/UL — HIGH (ref 0.1–0.6)
MONOCYTES NFR BLD AUTO: 5.7 % — SIGNIFICANT CHANGE UP (ref 1.7–9.3)
NEUTROPHILS # BLD AUTO: 13.11 K/UL — HIGH (ref 1.4–6.5)
NEUTROPHILS NFR BLD AUTO: 83.4 % — HIGH (ref 42.2–75.2)
NRBC # BLD: 0 /100 WBCS — SIGNIFICANT CHANGE UP (ref 0–0)
OPIATES UR-MCNC: NEGATIVE — SIGNIFICANT CHANGE UP
PCP SPEC-MCNC: SIGNIFICANT CHANGE UP
PLATELET # BLD AUTO: 191 K/UL — SIGNIFICANT CHANGE UP (ref 130–400)
POTASSIUM SERPL-MCNC: 4.1 MMOL/L — SIGNIFICANT CHANGE UP (ref 3.5–5)
POTASSIUM SERPL-SCNC: 4.1 MMOL/L — SIGNIFICANT CHANGE UP (ref 3.5–5)
PROCALCITONIN SERPL-MCNC: 0.02 NG/ML — SIGNIFICANT CHANGE UP (ref 0.02–0.1)
PROPOXYPHENE QUALITATIVE URINE RESULT: NEGATIVE — SIGNIFICANT CHANGE UP
PROT SERPL-MCNC: 6.1 G/DL — SIGNIFICANT CHANGE UP (ref 6–8)
RBC # BLD: 4.62 M/UL — SIGNIFICANT CHANGE UP (ref 4.2–5.4)
RBC # FLD: 13.6 % — SIGNIFICANT CHANGE UP (ref 11.5–14.5)
SODIUM SERPL-SCNC: 140 MMOL/L — SIGNIFICANT CHANGE UP (ref 135–146)
WBC # BLD: 15.71 K/UL — HIGH (ref 4.8–10.8)
WBC # FLD AUTO: 15.71 K/UL — HIGH (ref 4.8–10.8)

## 2021-05-22 PROCEDURE — 71045 X-RAY EXAM CHEST 1 VIEW: CPT | Mod: 26

## 2021-05-22 PROCEDURE — 93306 TTE W/DOPPLER COMPLETE: CPT | Mod: 26

## 2021-05-22 PROCEDURE — 99233 SBSQ HOSP IP/OBS HIGH 50: CPT

## 2021-05-22 RX ORDER — AZITHROMYCIN 500 MG/1
1 TABLET, FILM COATED ORAL
Qty: 3 | Refills: 0
Start: 2021-05-22 | End: 2021-05-24

## 2021-05-22 RX ORDER — BUDESONIDE AND FORMOTEROL FUMARATE DIHYDRATE 160; 4.5 UG/1; UG/1
2 AEROSOL RESPIRATORY (INHALATION)
Qty: 120 | Refills: 0
Start: 2021-05-22 | End: 2021-06-20

## 2021-05-22 RX ORDER — ALBUTEROL 90 UG/1
3 AEROSOL, METERED ORAL
Qty: 400 | Refills: 0
Start: 2021-05-22

## 2021-05-22 RX ADMIN — Medication 3 MILLILITER(S): at 02:34

## 2021-05-22 RX ADMIN — BUDESONIDE AND FORMOTEROL FUMARATE DIHYDRATE 2 PUFF(S): 160; 4.5 AEROSOL RESPIRATORY (INHALATION) at 08:05

## 2021-05-22 RX ADMIN — Medication 10 MILLILITER(S): at 05:23

## 2021-05-22 RX ADMIN — HEPARIN SODIUM 5000 UNIT(S): 5000 INJECTION INTRAVENOUS; SUBCUTANEOUS at 05:23

## 2021-05-22 RX ADMIN — Medication 3 MILLILITER(S): at 08:13

## 2021-05-22 RX ADMIN — CHLORHEXIDINE GLUCONATE 1 APPLICATION(S): 213 SOLUTION TOPICAL at 05:22

## 2021-05-22 RX ADMIN — PANTOPRAZOLE SODIUM 40 MILLIGRAM(S): 20 TABLET, DELAYED RELEASE ORAL at 05:25

## 2021-05-22 RX ADMIN — Medication 650 MILLIGRAM(S): at 12:53

## 2021-05-22 RX ADMIN — Medication 400 MILLIGRAM(S): at 08:05

## 2021-05-22 RX ADMIN — Medication 10 MILLILITER(S): at 02:32

## 2021-05-22 RX ADMIN — Medication 650 MILLIGRAM(S): at 12:23

## 2021-05-22 RX ADMIN — Medication 400 MILLIGRAM(S): at 08:35

## 2021-05-22 RX ADMIN — Medication 60 MILLIGRAM(S): at 05:24

## 2021-05-22 RX ADMIN — Medication 10 MILLILITER(S): at 12:18

## 2021-05-22 NOTE — DISCHARGE NOTE PROVIDER - CARE PROVIDERS DIRECT ADDRESSES
,[email or web address]

## 2021-05-22 NOTE — DISCHARGE NOTE PROVIDER - CARE PROVIDER_API CALL
Michael Perez  CRITICAL CARE MEDICINE  [address] [place] [id number]  Phone: [phone]  Fax: [phone]  Follow Up Time: 1 week    Reba Pro (DO)  Family Medicine  [address] [place] [id number]  Phone: [phone]  Fax: [phone]  Follow Up Time: 1 week

## 2021-05-22 NOTE — DISCHARGE NOTE NURSING/CASE MANAGEMENT/SOCIAL WORK - PATIENT PORTAL LINK FT
You can access the FollowMyHealth Patient Portal offered by [facility] by registering at the following website: [email or web address]. By joining [facility]’s FollowMyHealth portal, you will also be able to view your health information using other applications (apps) compatible with our system.

## 2021-05-22 NOTE — PROGRESS NOTE ADULT - SUBJECTIVE AND OBJECTIVE BOX
OVERNIGHT EVENTS: events noted, feels better, on NC    Vital Signs Last 24 Hrs  T(C): 36.2 (20 May 2021 06:33), Max: 36.8 (19 May 2021 11:00)  T(F): 97.2 (20 May 2021 06:33), Max: 98.3 (19 May 2021 11:00)  HR: 100 (20 May 2021 06:33) (77 - 102)  BP: 147/74 (20 May 2021 06:33) (127/89 - 183/81)  BP(mean): 106 (20 May 2021 04:00) (78 - 118)  RR: 18 (20 May 2021 06:33) (15 - 36)  SpO2: 100% (20 May 2021 06:33) (96% - 100%)    PHYSICAL EXAMINATION:    GENERAL: Comfortable    HEENT: Head is normocephalic and atraumatic.    NECK: Supple.    LUNGS: dec bs boht bases    HEART: Regular rate and rhythm without murmur.    ABDOMEN: Soft, nontender, and nondistended.      EXTREMITIES: Without any cyanosis, clubbing, rash, lesions or edema.    NEUROLOGIC: Grossly intact.    SKIN: No ulceration or induration present.      LABS:                        14.7   9.89  )-----------( 182      ( 20 May 2021 04:30 )             44.6     05-20    139  |  107  |  14  ----------------------------<  119<H>  3.8   |  18  |  0.7    Ca    9.3      20 May 2021 04:30  Phos  3.4     05-20  Mg     1.9     05-20    TPro  6.7  /  Alb  4.2  /  TBili  0.4  /  DBili  x   /  AST  21  /  ALT  30  /  AlkPhos  51  05-19        ABG - ( 19 May 2021 16:44 )  pH, Arterial: 7.35  pH, Blood: x     /  pCO2: 39    /  pO2: 156   / HCO3: 21    / Base Excess: -4.0  /  SaO2: 99                      Serum Pro-Brain Natriuretic Peptide: 347 pg/mL (05-20-21 @ 04:30)    Lactate, Blood: 2.1 mmol/L (05-20-21 @ 04:30)  Lactate, Blood: 2.9 mmol/L (05-20-21 @ 00:28)          05-19-21 @ 07:01  -  05-20-21 @ 07:00  --------------------------------------------------------  IN: 795 mL / OUT: 400 mL / NET: 395 mL        MICROBIOLOGY:      MEDICATIONS  (STANDING):  albuterol/ipratropium for Nebulization 3 milliLiter(s) Nebulizer every 6 hours  azithromycin  IVPB 500 milliGRAM(s) IV Intermittent every 24 hours  chlorhexidine 4% Liquid 1 Application(s) Topical <User Schedule>  heparin   Injectable 5000 Unit(s) SubCutaneous every 8 hours  lactated ringers. 1000 milliLiter(s) (75 mL/Hr) IV Continuous <Continuous>  methylPREDNISolone sodium succinate Injectable 125 milliGRAM(s) IV Push every 6 hours  pantoprazole    Tablet 40 milliGRAM(s) Oral daily    MEDICATIONS  (PRN):  acetaminophen   Tablet .. 650 milliGRAM(s) Oral every 6 hours PRN Mild Pain (1 - 3)      RADIOLOGY & ADDITIONAL STUDIES:    
Patient is a 56y old  Female who presents with a chief complaint of Asthma Exacerbation (20 May 2021 13:03)        Over Night Events: no major events overnight, did well.  off oxygen saturation 96%        ROS:     All ROS are negative except HPI         PHYSICAL EXAM    ICU Vital Signs Last 24 Hrs  T(C): 36.7 (21 May 2021 08:25), Max: 37.1 (20 May 2021 23:30)  T(F): 98 (21 May 2021 08:25), Max: 98.7 (20 May 2021 23:30)  HR: 88 (21 May 2021 08:25) (88 - 114)  BP: 147/83 (21 May 2021 08:25) (117/67 - 180/86)  BP(mean): 98 (21 May 2021 08:25) (86 - 132)  RR: 20 (21 May 2021 08:25) (18 - 47)  SpO2: 97% (21 May 2021 04:00) (95% - 99%)      CONSTITUTIONAL:  Well nourished.  NAD    ENT:   Airway patent,   Mouth with normal mucosa.   No thrush    EYES:   Pupils equal,   Round and reactive to light.    CARDIAC:   Normal rate,   Regular rhythm.    No edema      Vascular:  Normal systolic impulse  No Carotid bruits    RESPIRATORY:   End expiratory  wheezing  Bilateral BS  Normal chest expansion  Not tachypneic,  No use of accessory muscles    GASTROINTESTINAL:  Abdomen soft,   Non-tender,   No guarding,   + BS    MUSCULOSKELETAL:   Range of motion is not limited,  No clubbing, cyanosis    NEUROLOGICAL:   Alert and oriented   No motor  deficits.    SKIN:   Skin normal color for race,   Warm and dry and intact.   No evidence of rash.    PSYCHIATRIC:   Normal mood and affect.   No apparent risk to self or others.    HEMATOLOGICAL:  No cervical  lymphadenopathy.  no inguinal lymphadenopathy      05-20-21 @ 07:01  -  05-21-21 @ 07:00  --------------------------------------------------------  IN:    IV PiggyBack: 250 mL    Lactated Ringers: 225 mL    Oral Fluid: 490 mL  Total IN: 965 mL    OUT:  Total OUT: 0 mL    Total NET: 965 mL          LABS:                            13.5   16.66 )-----------( 196      ( 21 May 2021 06:14 )             42.8                                               05-21    141  |  112<H>  |  22<H>  ----------------------------<  206<H>  3.9   |  19  |  0.7    Ca    8.9      21 May 2021 06:14  Phos  3.4     05-20  Mg     1.9     05-20    TPro  6.0  /  Alb  3.5  /  TBili  <0.2  /  DBili  x   /  AST  13  /  ALT  22  /  AlkPhos  73  05-21                                                 CARDIAC MARKERS ( 20 May 2021 12:11 )  x     / <0.01 ng/mL / x     / x     / x                                                LIVER FUNCTIONS - ( 21 May 2021 06:14 )  Alb: 3.5 g/dL / Pro: 6.0 g/dL / ALK PHOS: 73 U/L / ALT: 22 U/L / AST: 13 U/L / GGT: x                                                                                                                                   ABG - ( 19 May 2021 16:44 )  pH, Arterial: 7.35  pH, Blood: x     /  pCO2: 39    /  pO2: 156   / HCO3: 21    / Base Excess: -4.0  /  SaO2: 99                  MEDICATIONS  (STANDING):  albuterol/ipratropium for Nebulization 3 milliLiter(s) Nebulizer every 6 hours  azithromycin  IVPB 500 milliGRAM(s) IV Intermittent every 24 hours  budesonide 160 MICROgram(s)/formoterol 4.5 MICROgram(s) Inhaler 2 Puff(s) Inhalation two times a day  chlorhexidine 4% Liquid 1 Application(s) Topical <User Schedule>  guaifenesin/dextromethorphan Oral Liquid 10 milliLiter(s) Oral every 4 hours  heparin   Injectable 5000 Unit(s) SubCutaneous every 8 hours  methylPREDNISolone sodium succinate Injectable 60 milliGRAM(s) IV Push every 8 hours  pantoprazole    Tablet 40 milliGRAM(s) Oral daily    MEDICATIONS  (PRN):  acetaminophen   Tablet .. 650 milliGRAM(s) Oral every 6 hours PRN Mild Pain (1 - 3)  ibuprofen  Tablet. 400 milliGRAM(s) Oral every 6 hours PRN Temp greater or equal to 38C (100.4F), Mild Pain (1 - 3)  sodium chloride 0.65% Nasal 1 Spray(s) Both Nostrils two times a day PRN Nasal Congestion      New X-rays reviewed:                                                                                  ECHO    CXR interpreted by me:  increased markings    
    OVERNIGHT EVENTS: events noted, feels better, on NC    Vital Signs Last 24 Hrs  T(C): 36.2 (20 May 2021 06:33), Max: 36.8 (19 May 2021 11:00)  T(F): 97.2 (20 May 2021 06:33), Max: 98.3 (19 May 2021 11:00)  HR: 100 (20 May 2021 06:33) (77 - 102)  BP: 147/74 (20 May 2021 06:33) (127/89 - 183/81)  BP(mean): 106 (20 May 2021 04:00) (78 - 118)  RR: 18 (20 May 2021 06:33) (15 - 36)  SpO2: 100% (20 May 2021 06:33) (96% - 100%)    PHYSICAL EXAMINATION:    GENERAL: Comfortable    HEENT: Head is normocephalic and atraumatic.    NECK: Supple.    LUNGS: dec bs boht bases    HEART: Regular rate and rhythm without murmur.    ABDOMEN: Soft, nontender, and nondistended.      EXTREMITIES: Without any cyanosis, clubbing, rash, lesions or edema.    NEUROLOGIC: Grossly intact.    SKIN: No ulceration or induration present.      LABS:                        14.7   9.89  )-----------( 182      ( 20 May 2021 04:30 )             44.6     05-20    139  |  107  |  14  ----------------------------<  119<H>  3.8   |  18  |  0.7    Ca    9.3      20 May 2021 04:30  Phos  3.4     05-20  Mg     1.9     05-20    TPro  6.7  /  Alb  4.2  /  TBili  0.4  /  DBili  x   /  AST  21  /  ALT  30  /  AlkPhos  51  05-19        ABG - ( 19 May 2021 16:44 )  pH, Arterial: 7.35  pH, Blood: x     /  pCO2: 39    /  pO2: 156   / HCO3: 21    / Base Excess: -4.0  /  SaO2: 99                      Serum Pro-Brain Natriuretic Peptide: 347 pg/mL (05-20-21 @ 04:30)    Lactate, Blood: 2.1 mmol/L (05-20-21 @ 04:30)  Lactate, Blood: 2.9 mmol/L (05-20-21 @ 00:28)          05-19-21 @ 07:01  -  05-20-21 @ 07:00  --------------------------------------------------------  IN: 795 mL / OUT: 400 mL / NET: 395 mL        MICROBIOLOGY:      MEDICATIONS  (STANDING):  albuterol/ipratropium for Nebulization 3 milliLiter(s) Nebulizer every 6 hours  azithromycin  IVPB 500 milliGRAM(s) IV Intermittent every 24 hours  chlorhexidine 4% Liquid 1 Application(s) Topical <User Schedule>  heparin   Injectable 5000 Unit(s) SubCutaneous every 8 hours  lactated ringers. 1000 milliLiter(s) (75 mL/Hr) IV Continuous <Continuous>  methylPREDNISolone sodium succinate Injectable 125 milliGRAM(s) IV Push every 6 hours  pantoprazole    Tablet 40 milliGRAM(s) Oral daily    MEDICATIONS  (PRN):  acetaminophen   Tablet .. 650 milliGRAM(s) Oral every 6 hours PRN Mild Pain (1 - 3)      RADIOLOGY & ADDITIONAL STUDIES:    
  PRIYA HERNANDEZ  56[facility]  [facility]      Patient is a 56y old  Female who presents with a chief complaint of Asthma Exacerbation (22 May 2021 10:38)      INTERVAL HPI/OVERNIGHT EVENTS:    patient feels significantly improved , ambulated patient around unit with no complaints    REVIEW OF SYSTEMS:  CONSTITUTIONAL: No fever, weight loss, or fatigue  EYES: No eye pain, visual disturbances, or discharge  ENMT:  No difficulty hearing, tinnitus, vertigo; No sinus or throat pain  NECK: No pain or stiffness  BREASTS: No pain, masses, or nipple discharge  RESPIRATORY: No cough, wheezing, chills or hemoptysis; No shortness of breath  CARDIOVASCULAR: No chest pain, palpitations, dizziness, or leg swelling  GASTROINTESTINAL: No abdominal or epigastric pain. No nausea, vomiting, or hematemesis; No diarrhea or constipation. No melena or hematochezia.  GENITOURINARY: No dysuria, frequency, hematuria, or incontinence  NEUROLOGICAL: No headaches, memory loss, loss of strength, numbness, or tremors  SKIN: No itching, burning, rashes, or lesions   LYMPH NODES: No enlarged glands  ENDOCRINE: No heat or cold intolerance; No hair loss  MUSCULOSKELETAL: No joint pain or swelling; No muscle, back, or extremity pain  PSYCHIATRIC: No depression, anxiety, mood swings, or difficulty sleeping  HEME/LYMPH: No easy bruising, or bleeding gums  ALLERY AND IMMUNOLOGIC: No hives or eczema  FAMILY HISTORY:  FHx: colon cancer (Mother)    FHx: diabetes mellitus (Father)    FHx: cardiovascular disease (Father)      T(C): 35.5 (05-22-21 @ 05:08), Max: 36.9 (05-21-21 @ 21:23)  HR: 76 (05-22-21 @ 05:08) (76 - 92)  BP: 158/94 (05-22-21 @ 05:08) (130/62 - 162/88)  RR: 19 (05-22-21 @ 05:08) (18 - 19)  SpO2: 96% (05-21-21 @ 21:01) (96% - 97%)  Wt(kg): --Vital Signs Last 24 Hrs  T(C): 35.5 (22 May 2021 05:08), Max: 36.9 (21 May 2021 21:23)  T(F): 95.9 (22 May 2021 05:08), Max: 98.5 (21 May 2021 21:23)  HR: 76 (22 May 2021 05:08) (76 - 92)  BP: 158/94 (22 May 2021 05:08) (130/62 - 162/88)  BP(mean): --  RR: 19 (22 May 2021 05:08) (18 - 19)  SpO2: 96% (21 May 2021 21:01) (96% - 97%)    PHYSICAL EXAM:  GENERAL: NAD, well-groomed, well-developed  HEAD:  Atraumatic, Normocephalic  EYES: EOMI, PERRLA, conjunctiva and sclera clear  ENMT: No tonsillar erythema, exudates, or enlargement; Moist mucous membranes, Good dentition, No lesions  NECK: Supple, No JVD, Normal thyroid  NERVOUS SYSTEM:  Alert & Oriented X3, Good concentration; Motor Strength 5/5 B/L upper and lower extremities; DTRs 2+ intact and symmetric  PULM: Clear to auscultation bilaterally  CARDIAC: Regular rate and rhythm; No murmurs, rubs, or gallops  GI: Soft, Nontender, Nondistended; Bowel sounds present  EXTREMITIES:  2+ Peripheral Pulses, No clubbing, cyanosis, or edema  LYMPH: No lymphadenopathy noted  SKIN: No rashes or lesions    Consultant(s) Notes Reviewed:  [x ] YES  [ ] NO  Care Discussed with Consultants/Other Providers [ x] YES  [ ] NO    LABS:                            13.7   15.71 )-----------( 191      ( 22 May 2021 05:53 )             41.6   05-22    140  |  107  |  19  ----------------------------<  111<H>  4.1   |  21  |  0.8    Ca    8.8      22 May 2021 05:53  Mg     2.0     05-22    TPro  6.1  /  Alb  3.8  /  TBili  <0.2  /  DBili  x   /  AST  14  /  ALT  20  /  AlkPhos  60  05-22            acetaminophen   Tablet .. 650 milliGRAM(s) Oral every 6 hours PRN  albuterol/ipratropium for Nebulization 3 milliLiter(s) Nebulizer every 6 hours  azithromycin  IVPB 500 milliGRAM(s) IV Intermittent every 24 hours  budesonide 160 MICROgram(s)/formoterol 4.5 MICROgram(s) Inhaler 2 Puff(s) Inhalation two times a day  chlorhexidine 4% Liquid 1 Application(s) Topical <User Schedule>  diphenhydrAMINE 25 milliGRAM(s) Oral every 4 hours PRN  guaifenesin/dextromethorphan Oral Liquid 10 milliLiter(s) Oral every 4 hours  heparin   Injectable 5000 Unit(s) SubCutaneous every 8 hours  ibuprofen  Tablet. 400 milliGRAM(s) Oral every 6 hours PRN  methylPREDNISolone sodium succinate Injectable 60 milliGRAM(s) IV Push every 12 hours  pantoprazole    Tablet 40 milliGRAM(s) Oral daily  sodium chloride 0.65% Nasal 1 Spray(s) Both Nostrils two times a day PRN      HEALTH ISSUES - PROBLEM Dx:          Case Discussed with House Staff   Spectra [phone]

## 2021-05-22 NOTE — DISCHARGE NOTE PROVIDER - NSDCMRMEDTOKEN_GEN_ALL_CORE_FT
albuterol 90 mcg/inh inhalation aerosol: 2 puff(s) inhaled every 6 hours, As Needed  budesonide-formoterol 160 mcg-4.5 mcg/inh inhalation aerosol: 2 puff(s) inhaled 2 times a day   predniSONE 20 mg oral tablet: 1 tab(s) orally once a day    albuterol 0.63 mg/3 mL (0.021%) inhalation solution: 3 milliliter(s) by nebulizer 4 times a day   albuterol 90 mcg/inh inhalation aerosol: 2 puff(s) inhaled every 6 hours, As Needed  budesonide-formoterol 160 mcg-4.5 mcg/inh inhalation aerosol: 2 puff(s) inhaled 2 times a day   predniSONE 10 mg oral tablet: 4 tab(s) orally once a day  for 3 days , 2 tab once a day for 3 days , 1 tab once a day for 3 days

## 2021-05-22 NOTE — DISCHARGE NOTE PROVIDER - NSDCCPCAREPLAN_GEN_ALL_CORE_FT
PRINCIPAL DISCHARGE DIAGNOSIS  Diagnosis: Acute respiratory failure  Assessment and Plan of Treatment: You presented for acute hypoxic respiratory failure secondary to asthma exacerbation. You were treated with inhalers, oxygen and corticosteroids with improvement. Your oxygen requirements were titrated and you are breathing comfortably on room air. Take your medications as prescribed and follow up with Dr Misael Kim in the office.       PRINCIPAL DISCHARGE DIAGNOSIS  Diagnosis: Acute respiratory failure  Assessment and Plan of Treatment: You presented for acute hypoxic respiratory failure secondary to asthma exacerbation. You were treated with inhalers, oxygen and corticosteroids with improvement. Your oxygen requirements were titrated and you are breathing comfortably on room air. Take your medications as prescribed and follow up with Dr Misael Kim in the office.      SECONDARY DISCHARGE DIAGNOSES  Diagnosis: High human chorionic gonadotropin (hCG) level, unspecified trimester  Assessment and Plan of Treatment: repeat outpatient

## 2021-05-22 NOTE — DISCHARGE NOTE PROVIDER - HOSPITAL COURSE
56 year old female with a significant PMHx of asthma who presented to the ED with a cc/o shortness of breath associated with dry cough x2 days. Her symptoms did not improve with rescue inhaler (albuterol) and subsequently presented to the ED for further evaluation. Her last asthma exacerbation with 8 years ago and does not follow up with a Pulmonologist. ROS is also positive for reports of feeling tired the last few weeks. ROS is otherwise negative. In the ED, the patient was tachypneic and tachycardic, and was placed on BIPAP with improvement in her respiratory distress. She is admitted to critical care for Asthma Exacerbation.   She tested positive for rhinovirus and enterovirus.  She was treated for asthma exacerbation with inhalers, nebulizers and oxygen that was titrated progressively. She is breathing comfortably on room air.  Patient is stable and ready for discharge. She will follow up with pulmonary in the clinics.   56 year old female with a significant PMHx of asthma who presented to the ED with a cc/o shortness of breath associated with dry cough x2 days. Her symptoms did not improve with rescue inhaler (albuterol) and subsequently presented to the ED for further evaluation. Her last asthma exacerbation with 8 years ago and does not follow up with a Pulmonologist. ROS is also positive for reports of feeling tired the last few weeks. ROS is otherwise negative. In the ED, the patient was tachypneic and tachycardic, and was placed on BIPAP with improvement in her respiratory distress. She is admitted to critical care for Asthma Exacerbation.   She tested positive for rhinovirus and enterovirus.  She was treated for asthma exacerbation with inhalers, nebulizers and oxygen that was titrated progressively. She is breathing comfortably on room air.  Patient is stable and ready for discharge. She will follow up with pulmonary in the clinics.    < from: US Pelvis Complete (US Pelvis Complete .) (05.21.21 @ 20:23) >    EXAM:  US PELVIC COMPLETE            PROCEDURE DATE:  05/21/2021            INTERPRETATION:  CLINICAL INFORMATION: Post hysterectomy    LMP: NA    COMPARISON: None available.    TECHNIQUE: Transabdominal and transvaginal sonogram      FINDINGS/  impression: Status post hysterectomy with no mass either in the vaginal cuff region or within the pelvis. No free pelvic fluid.:    < end of copied text >        < from: Xray Chest 1 View- PORTABLE-Routine (Xray Chest 1 View- PORTABLE-Routine in AM.) (05.22.21 @ 08:40) >    Findings:    Support devices: None.    Cardiac/mediastinum/hilum: Heart size within normal limits, thoracic aortic calcification.    Lung parenchyma/Pleura: Bibasilar opacities.    Skeleton/soft tissues: Unchanged.    Impression:    Bibasilar opacities.    < end of copied text >                                13.7   15.71 )-----------( 191      ( 22 May 2021 05:53 )             41.6   05-22    140  |  107  |  19  ----------------------------<  111<H>  4.1   |  21  |  0.8    Ca    8.8      22 May 2021 05:53  Mg     2.0     05-22    TPro  6.1  /  Alb  3.8  /  TBili  <0.2  /  DBili  x   /  AST  14  /  ALT  20  /  AlkPhos  60  05-22

## 2021-05-22 NOTE — DISCHARGE NOTE PROVIDER - PROVIDER TOKENS
PROVIDER:[TOKEN:[[id number]],FOLLOWUP:[1 week]],PROVIDER:[TOKEN:[[id number]],FOLLOWUP:[1 week]]

## 2021-05-22 NOTE — PROGRESS NOTE ADULT - ASSESSMENT
HPI:  This is a 56 year old female with a significant PMHx of asthma who presented to the ED with a cc/o shortness of breath associated with dry cough x2 days. Her symptoms did not improve with rescue inhaler (albuterol) and subsequently presented to the ED for further evaluation. Her last asthma exacerbation with 8 years ago and does not follow up with a Pulmonologist. ROS is also positive for reports of feeling tired the last few weeks. ROS is otherwise negative. In the ED, the patient was tachypneic and tachycardic, and was placed on BIPAP with improvement in her respiratory distress. She is admitted to critical care for Asthma Exacerbation.  (19 May 2021 15:13)    #Acute respiratory failure secondary to acute asthma versus copd exacerbation secondary to entro/rhinovirus  prednisone taper 40 mg x2 days , 20 mg x 3 days , 10 mg x 3 days   azithromycin x 2 more days  nebulizer  pulmonary follow up op for pfts   on room air     #Class 2 obesity BMI 36 patient needs to see dieitian outpatient for further evaluation     #Suspected angela - op sleep study    #Elevated hcg - sp yves bso - op repeat hcg    Progress Note Handoff    Pending: DC home time spent 48 min  Family discussion: patient verbalized understanding and agreeable to plan of care     Disposition: Home___

## 2021-05-30 DIAGNOSIS — R89.1 ABNORMAL LEVEL OF HORMONES IN SPECIMENS FROM OTHER ORGANS, SYSTEMS AND TISSUES: ICD-10-CM

## 2021-05-30 DIAGNOSIS — E78.5 HYPERLIPIDEMIA, UNSPECIFIED: ICD-10-CM

## 2021-05-30 DIAGNOSIS — E66.9 OBESITY, UNSPECIFIED: ICD-10-CM

## 2021-05-30 DIAGNOSIS — Z90.710 ACQUIRED ABSENCE OF BOTH CERVIX AND UTERUS: ICD-10-CM

## 2021-05-30 DIAGNOSIS — B97.10 UNSPECIFIED ENTEROVIRUS AS THE CAUSE OF DISEASES CLASSIFIED ELSEWHERE: ICD-10-CM

## 2021-05-30 DIAGNOSIS — J45.901 UNSPECIFIED ASTHMA WITH (ACUTE) EXACERBATION: ICD-10-CM

## 2021-05-30 DIAGNOSIS — Z90.721 ACQUIRED ABSENCE OF OVARIES, UNILATERAL: ICD-10-CM

## 2021-05-30 DIAGNOSIS — B97.89 OTHER VIRAL AGENTS AS THE CAUSE OF DISEASES CLASSIFIED ELSEWHERE: ICD-10-CM

## 2021-05-30 DIAGNOSIS — Z87.891 PERSONAL HISTORY OF NICOTINE DEPENDENCE: ICD-10-CM

## 2021-05-30 DIAGNOSIS — J98.4 OTHER DISORDERS OF LUNG: ICD-10-CM

## 2021-05-30 DIAGNOSIS — I10 ESSENTIAL (PRIMARY) HYPERTENSION: ICD-10-CM

## 2021-05-30 DIAGNOSIS — G47.33 OBSTRUCTIVE SLEEP APNEA (ADULT) (PEDIATRIC): ICD-10-CM

## 2021-05-30 DIAGNOSIS — J96.01 ACUTE RESPIRATORY FAILURE WITH HYPOXIA: ICD-10-CM

## 2021-05-30 DIAGNOSIS — J44.1 CHRONIC OBSTRUCTIVE PULMONARY DISEASE WITH (ACUTE) EXACERBATION: ICD-10-CM

## 2021-07-23 PROBLEM — E66.9 OBESITY, UNSPECIFIED: Chronic | Status: ACTIVE | Noted: 2021-05-19

## 2021-07-23 PROBLEM — N85.8 OTHER SPECIFIED NONINFLAMMATORY DISORDERS OF UTERUS: Chronic | Status: ACTIVE | Noted: 2021-05-19

## 2021-07-23 PROBLEM — I10 ESSENTIAL (PRIMARY) HYPERTENSION: Chronic | Status: ACTIVE | Noted: 2021-05-19

## 2021-07-23 PROBLEM — J45.909 UNSPECIFIED ASTHMA, UNCOMPLICATED: Chronic | Status: ACTIVE | Noted: 2021-05-19

## 2021-07-31 ENCOUNTER — OUTPATIENT (OUTPATIENT)
Dept: OUTPATIENT SERVICES | Facility: HOSPITAL | Age: 56
LOS: 1 days | Discharge: HOME | End: 2021-07-31
Payer: COMMERCIAL

## 2021-07-31 DIAGNOSIS — Z90.721 ACQUIRED ABSENCE OF OVARIES, UNILATERAL: Chronic | ICD-10-CM

## 2021-07-31 DIAGNOSIS — F17.211 NICOTINE DEPENDENCE, CIGARETTES, IN REMISSION: ICD-10-CM

## 2021-07-31 PROCEDURE — 71271 CT THORAX LUNG CANCER SCR C-: CPT | Mod: 26

## 2022-10-17 NOTE — ED ADULT NURSE NOTE - NSFALLRSKPASTHIST_ED_ALL_ED
Patient presents with:  Cough: X8 days    Fatigue: Not feeling right        Clinical Decision Making:  Patient experiencing cough for the past 8 days.  Known exposure to influenza A.  Chest x-ray is negative for pneumonia.  Influenza test was offered, but declined as it would not change treatment plan.  COVID test offered, but declined as family members were sick have tested negative.  Recommend treatment for suspected bronchitis.      ICD-10-CM    1. Acute cough  R05.1 XR Chest 2 Views     predniSONE (DELTASONE) 20 MG tablet     benzonatate (TESSALON) 100 MG capsule     albuterol (PROAIR HFA/PROVENTIL HFA/VENTOLIN HFA) 108 (90 Base) MCG/ACT inhaler          Patient Instructions   There were no signs of pneumonia on your xray.    Your symptoms are most likely due to acute bronchitis.  This is inflammation of the tubes leading into the lungs, most often due to a viral infection and an antibiotic will not help this.    I have prescribed an albuterol inhaler to help with the cough/wheezing.  This can be used as needed every 6 hours.    Begin taking Prednisone 40 mg daily.  This medication is best taken in the morning and with food.  You can take your first dose right away to get things rolling.    May take Tessalon Perles as needed for cough.  May also try to use Mucinex or Robitussin.    Please monitor symptoms carefully.  If developing worsening chest pain, shortness of breath, fever, coughing up blood, extreme fatigue, or any other new, concerning symptoms, come back to clinic or go to ER immediately.  Otherwise, if no improvement in symptoms in one week, follow-up with your primary care provider.        HPI:  Julius Wilson is a 39 year old female who presents today complaining of fatigue and cough x 8 days. She has a sensation of a bubble in her chest when she is about to cough. Similar to a burp that's stuck. She has not had COVID and she has not done any COVID tests. Her son did recently get dx with influenza  "a and he was sick around the same time. No known hx of lung disease. She has been taking Mucinex a few days, then Sudafed over the past few days which has been helpful for the nasal congestion.     History obtained from the patient.    Problem List:  2021: Monochorionic diamniotic twin gestation in second trimester  2017: S/P  section  2017: Monochorionic diamniotic twin pregnancy  2017: Monochorionic diamniotic twin pregnancy  2017: Twin to twin transfusion, third trimester  2017: Twin to twin transfusion, third trimester      Past Medical History:   Diagnosis Date     Medical history reviewed with no changes      Monochorionic diamniotic twin gestation in second trimester 2021     Motion sickness      S/P  section 3/19/2017     Twin to twin transfusion, third trimester 3/3/2017       Social History     Tobacco Use     Smoking status: Some Days     Packs/day: 0.00     Types: Cigarettes     Smokeless tobacco: Never     Tobacco comments:     not every day smoker, \"binges\"   Substance Use Topics     Alcohol use: Yes     Comment: 1-2 / week       Review of Systems   Constitutional: Positive for fatigue and fever (on and off).   HENT: Positive for congestion, sinus pressure and sinus pain.    Respiratory: Positive for cough (dry).        Vitals:    10/17/22 1303   BP: 107/70   BP Location: Right arm   Patient Position: Sitting   Cuff Size: Adult Large   Pulse: 83   Resp: 16   Temp: 97.9  F (36.6  C)   TempSrc: Oral   SpO2: 99%   Weight: 83 kg (183 lb)       Physical Exam  Vitals and nursing note reviewed.   Constitutional:       General: She is not in acute distress.     Appearance: She is not toxic-appearing or diaphoretic.   HENT:      Head: Normocephalic and atraumatic.      Right Ear: External ear normal.      Left Ear: External ear normal.   Eyes:      Conjunctiva/sclera: Conjunctivae normal.   Cardiovascular:      Rate and Rhythm: Normal rate and regular rhythm.      Heart " sounds: No murmur heard.  Pulmonary:      Effort: Pulmonary effort is normal. No respiratory distress.   Neurological:      Mental Status: She is alert.   Psychiatric:         Mood and Affect: Mood normal.         Behavior: Behavior normal.         Thought Content: Thought content normal.         Judgment: Judgment normal.         Results:  Results for orders placed or performed in visit on 10/17/22   XR Chest 2 Views     Status: None    Narrative    EXAM: XR CHEST 2 VIEWS  LOCATION: [facility]  DATE/TIME: 10/17/2022 2:06 PM    INDICATION: Cough x 8 days. CHest discomfort. Lungs are sounding pretty clear. R.o pneumonia.  COMPARISON: 01/30/2017      Impression    IMPRESSION: Negative chest.         At the end of the encounter, I discussed results, diagnosis, medications. Discussed red flags for immediate return to clinic/ER, as well as indications for follow up if no improvement. Patient understood and agreed to plan. Patient was stable for discharge.   no

## 2024-03-13 ENCOUNTER — INPATIENT (INPATIENT)
Facility: HOSPITAL | Age: 59
LOS: 1 days | Discharge: HOME | DRG: 282 | End: 2024-03-15
Attending: STUDENT IN AN ORGANIZED HEALTH CARE EDUCATION/TRAINING PROGRAM | Admitting: STUDENT IN AN ORGANIZED HEALTH CARE EDUCATION/TRAINING PROGRAM
Payer: COMMERCIAL

## 2024-03-13 VITALS
DIASTOLIC BLOOD PRESSURE: 92 MMHG | OXYGEN SATURATION: 97 % | SYSTOLIC BLOOD PRESSURE: 130 MMHG | HEIGHT: 67 IN | TEMPERATURE: 98 F | WEIGHT: 201.94 LBS | HEART RATE: 109 BPM | RESPIRATION RATE: 25 BRPM

## 2024-03-13 DIAGNOSIS — R07.9 CHEST PAIN, UNSPECIFIED: ICD-10-CM

## 2024-03-13 DIAGNOSIS — Z90.721 ACQUIRED ABSENCE OF OVARIES, UNILATERAL: Chronic | ICD-10-CM

## 2024-03-13 LAB
ALBUMIN SERPL ELPH-MCNC: 4.1 G/DL — SIGNIFICANT CHANGE UP (ref 3.5–5.2)
ALP SERPL-CCNC: 51 U/L — SIGNIFICANT CHANGE UP (ref 30–115)
ALT FLD-CCNC: 36 U/L — SIGNIFICANT CHANGE UP (ref 0–41)
ANION GAP SERPL CALC-SCNC: 11 MMOL/L — SIGNIFICANT CHANGE UP (ref 7–14)
APTT BLD: 29 SEC — SIGNIFICANT CHANGE UP (ref 27–39.2)
AST SERPL-CCNC: 30 U/L — SIGNIFICANT CHANGE UP (ref 0–41)
BASOPHILS # BLD AUTO: 0.01 K/UL — SIGNIFICANT CHANGE UP (ref 0–0.2)
BASOPHILS NFR BLD AUTO: 0.1 % — SIGNIFICANT CHANGE UP (ref 0–1)
BILIRUB SERPL-MCNC: 0.3 MG/DL — SIGNIFICANT CHANGE UP (ref 0.2–1.2)
BUN SERPL-MCNC: 27 MG/DL — HIGH (ref 10–20)
CALCIUM SERPL-MCNC: 9.5 MG/DL — SIGNIFICANT CHANGE UP (ref 8.4–10.4)
CHLORIDE SERPL-SCNC: 108 MMOL/L — SIGNIFICANT CHANGE UP (ref 98–110)
CO2 SERPL-SCNC: 21 MMOL/L — SIGNIFICANT CHANGE UP (ref 17–32)
CREAT SERPL-MCNC: 0.9 MG/DL — SIGNIFICANT CHANGE UP (ref 0.7–1.5)
EGFR: 74 ML/MIN/1.73M2 — SIGNIFICANT CHANGE UP
EOSINOPHIL # BLD AUTO: 0 K/UL — SIGNIFICANT CHANGE UP (ref 0–0.7)
EOSINOPHIL NFR BLD AUTO: 0 % — SIGNIFICANT CHANGE UP (ref 0–8)
GLUCOSE SERPL-MCNC: 95 MG/DL — SIGNIFICANT CHANGE UP (ref 70–99)
HCT VFR BLD CALC: 42.9 % — SIGNIFICANT CHANGE UP (ref 37–47)
HGB BLD-MCNC: 14.4 G/DL — SIGNIFICANT CHANGE UP (ref 12–16)
IMM GRANULOCYTES NFR BLD AUTO: 0.8 % — HIGH (ref 0.1–0.3)
INR BLD: 1.14 RATIO — SIGNIFICANT CHANGE UP (ref 0.65–1.3)
LYMPHOCYTES # BLD AUTO: 1.19 K/UL — LOW (ref 1.2–3.4)
LYMPHOCYTES # BLD AUTO: 9.1 % — LOW (ref 20.5–51.1)
MCHC RBC-ENTMCNC: 30 PG — SIGNIFICANT CHANGE UP (ref 27–31)
MCHC RBC-ENTMCNC: 33.6 G/DL — SIGNIFICANT CHANGE UP (ref 32–37)
MCV RBC AUTO: 89.4 FL — SIGNIFICANT CHANGE UP (ref 81–99)
MONOCYTES # BLD AUTO: 0.89 K/UL — HIGH (ref 0.1–0.6)
MONOCYTES NFR BLD AUTO: 6.8 % — SIGNIFICANT CHANGE UP (ref 1.7–9.3)
NEUTROPHILS # BLD AUTO: 10.9 K/UL — HIGH (ref 1.4–6.5)
NEUTROPHILS NFR BLD AUTO: 83.2 % — HIGH (ref 42.2–75.2)
NRBC # BLD: 0 /100 WBCS — SIGNIFICANT CHANGE UP (ref 0–0)
NT-PROBNP SERPL-SCNC: 5320 PG/ML — HIGH (ref 0–300)
PLATELET # BLD AUTO: 240 K/UL — SIGNIFICANT CHANGE UP (ref 130–400)
PMV BLD: 10.7 FL — HIGH (ref 7.4–10.4)
POTASSIUM SERPL-MCNC: 5.5 MMOL/L — HIGH (ref 3.5–5)
POTASSIUM SERPL-SCNC: 5.5 MMOL/L — HIGH (ref 3.5–5)
PROT SERPL-MCNC: 6.9 G/DL — SIGNIFICANT CHANGE UP (ref 6–8)
PROTHROM AB SERPL-ACNC: 13 SEC — HIGH (ref 9.95–12.87)
RBC # BLD: 4.8 M/UL — SIGNIFICANT CHANGE UP (ref 4.2–5.4)
RBC # FLD: 13.2 % — SIGNIFICANT CHANGE UP (ref 11.5–14.5)
SODIUM SERPL-SCNC: 140 MMOL/L — SIGNIFICANT CHANGE UP (ref 135–146)
TROPONIN T, HIGH SENSITIVITY RESULT: 46 NG/L — HIGH (ref 6–13)
TROPONIN T, HIGH SENSITIVITY RESULT: 49 NG/L — HIGH (ref 6–13)
WBC # BLD: 13.1 K/UL — HIGH (ref 4.8–10.8)
WBC # FLD AUTO: 13.1 K/UL — HIGH (ref 4.8–10.8)

## 2024-03-13 PROCEDURE — 86900 BLOOD TYPING SEROLOGIC ABO: CPT

## 2024-03-13 PROCEDURE — 85610 PROTHROMBIN TIME: CPT

## 2024-03-13 PROCEDURE — 80053 COMPREHEN METABOLIC PANEL: CPT

## 2024-03-13 PROCEDURE — 85730 THROMBOPLASTIN TIME PARTIAL: CPT

## 2024-03-13 PROCEDURE — 86850 RBC ANTIBODY SCREEN: CPT

## 2024-03-13 PROCEDURE — 85025 COMPLETE CBC W/AUTO DIFF WBC: CPT

## 2024-03-13 PROCEDURE — C1894: CPT

## 2024-03-13 PROCEDURE — 99285 EMERGENCY DEPT VISIT HI MDM: CPT

## 2024-03-13 PROCEDURE — C1769: CPT

## 2024-03-13 PROCEDURE — 93005 ELECTROCARDIOGRAM TRACING: CPT

## 2024-03-13 PROCEDURE — 93010 ELECTROCARDIOGRAM REPORT: CPT | Mod: 77

## 2024-03-13 PROCEDURE — 93458 L HRT ARTERY/VENTRICLE ANGIO: CPT

## 2024-03-13 PROCEDURE — 84484 ASSAY OF TROPONIN QUANT: CPT

## 2024-03-13 PROCEDURE — 94640 AIRWAY INHALATION TREATMENT: CPT

## 2024-03-13 PROCEDURE — 71045 X-RAY EXAM CHEST 1 VIEW: CPT | Mod: 26

## 2024-03-13 PROCEDURE — 36415 COLL VENOUS BLD VENIPUNCTURE: CPT

## 2024-03-13 PROCEDURE — 83036 HEMOGLOBIN GLYCOSYLATED A1C: CPT

## 2024-03-13 PROCEDURE — 86901 BLOOD TYPING SEROLOGIC RH(D): CPT

## 2024-03-13 PROCEDURE — 80061 LIPID PANEL: CPT

## 2024-03-13 PROCEDURE — 84100 ASSAY OF PHOSPHORUS: CPT

## 2024-03-13 PROCEDURE — 83735 ASSAY OF MAGNESIUM: CPT

## 2024-03-13 PROCEDURE — C1887: CPT

## 2024-03-13 RX ORDER — MORPHINE SULFATE 50 MG/1
4 CAPSULE, EXTENDED RELEASE ORAL ONCE
Refills: 0 | Status: DISCONTINUED | OUTPATIENT
Start: 2024-03-13 | End: 2024-03-13

## 2024-03-13 RX ORDER — HEPARIN SODIUM 5000 [USP'U]/ML
4000 INJECTION INTRAVENOUS; SUBCUTANEOUS EVERY 6 HOURS
Refills: 0 | Status: DISCONTINUED | OUTPATIENT
Start: 2024-03-13 | End: 2024-03-15

## 2024-03-13 RX ORDER — ACETAMINOPHEN 500 MG
650 TABLET ORAL ONCE
Refills: 0 | Status: COMPLETED | OUTPATIENT
Start: 2024-03-13 | End: 2024-03-13

## 2024-03-13 RX ORDER — HEPARIN SODIUM 5000 [USP'U]/ML
4000 INJECTION INTRAVENOUS; SUBCUTANEOUS ONCE
Refills: 0 | Status: COMPLETED | OUTPATIENT
Start: 2024-03-13 | End: 2024-03-13

## 2024-03-13 RX ORDER — IPRATROPIUM/ALBUTEROL SULFATE 18-103MCG
3 AEROSOL WITH ADAPTER (GRAM) INHALATION ONCE
Refills: 0 | Status: COMPLETED | OUTPATIENT
Start: 2024-03-13 | End: 2024-03-13

## 2024-03-13 RX ORDER — HEPARIN SODIUM 5000 [USP'U]/ML
INJECTION INTRAVENOUS; SUBCUTANEOUS
Qty: 25000 | Refills: 0 | Status: DISCONTINUED | OUTPATIENT
Start: 2024-03-13 | End: 2024-03-15

## 2024-03-13 RX ORDER — DILTIAZEM HCL 120 MG
30 CAPSULE, EXT RELEASE 24 HR ORAL ONCE
Refills: 0 | Status: COMPLETED | OUTPATIENT
Start: 2024-03-13 | End: 2024-03-13

## 2024-03-13 RX ADMIN — Medication 650 MILLIGRAM(S): at 23:37

## 2024-03-13 RX ADMIN — HEPARIN SODIUM 1000 UNIT(S)/HR: 5000 INJECTION INTRAVENOUS; SUBCUTANEOUS at 21:57

## 2024-03-13 RX ADMIN — HEPARIN SODIUM 4000 UNIT(S): 5000 INJECTION INTRAVENOUS; SUBCUTANEOUS at 21:56

## 2024-03-13 NOTE — ED PROVIDER NOTE - PHYSICAL EXAMINATION
GENERAL: Well-nourished, Well-developed. NAD.  HEAD: No visible or palpable bumps or hematomas. No ecchymosis behind ears B/L.  Eyes: PERRLA, EOMI.   CVS: No JVD. No reproducible chest wall tenderness. Normal S1,S2. No murmurs appreciated on auscultation   RESP: No use of accessory muscles. Chest rise symmetrical with good expansion. Lungs clear to auscultation B/L. No wheezing, rales, or rhonchi auscultated.  Skin: Warm, Dry. No rashes or lesions. Good cap refill < 2 sec B/L.  EXT: Radial and pedal pulses present B/L. No calf tenderness or swelling B/L. No palpable cords. No pedal edema B/L.

## 2024-03-13 NOTE — ED ADULT NURSE NOTE - CHIEF COMPLAINT QUOTE
Pt c/o was at [facility] discharged yesterday for SOB w elevated trop. Pt was sent by PMD for possible heart cath. Diagnosed w afib at [facility]. Hx COPD. States it feels someone sitting on her chest 3 = A little assistance

## 2024-03-13 NOTE — ED PROVIDER NOTE - OBJECTIVE STATEMENT
59-year-old female past medical history hypertension, asthma, COPD, newly diagnosed with A-fib on Monday started on Eliquis 5 mg presenting to ED for evaluation of chest tightness and shortness of breath since Monday.  Patient states she was seen at [facility] at that time had elevated troponin, was discharged and saw Dr Patiño today who sent patient to ED for potential catheterization on Friday.  Patient reporting mild chest heaviness at this time.  Denies fever, chills, syncope.

## 2024-03-13 NOTE — ED ADULT TRIAGE NOTE - CHIEF COMPLAINT QUOTE
Pt c/o was at [facility] discharged yesterday for SOB w elevated trop. Pt was sent by PMD for possible heart cath. Diagnosed w afib at [facility]. Hx COPD. States it feels someone sitting on her chest

## 2024-03-13 NOTE — ED PROVIDER NOTE - CLINICAL SUMMARY MEDICAL DECISION MAKING FREE TEXT BOX
59-year-old female with history of hypertension, asthma, COPD and new onset A-fib diagnosed this week at [facility] presenting today for admission for cath.  Patient is on Eliquis, states that she had chest pain and shortness of breath intermittent since Monday.  Followed up with Dr. Forte who referred her to the ED for admission for left heart cath.  States that at [facility], she had elevated troponins in the 70s.  Denies bloody stools or melena.  Denies trauma.  On exam well-appearing no acute distress, noted to have mild wheezing.  ekg independently interpreted by me, Dr. Early A-fib initially with RVR on repeat heart rate in the 100s, no STEMI.  Labs ordered and reviewed appear to be stable, initial troponin 49 and repeat was 46.  Chest xray images independently interpreted by me, Dr. Early, no focal opacities.  Patient given 1 DuoNeb, started on a heparin drip and admitted for further management.

## 2024-03-13 NOTE — ED ADULT NURSE NOTE - OBJECTIVE STATEMENT
60 y/o female presented to ed c/o chest discomfort. Pt discharged yesterday from [facility] for elevated trop and SOB, pt treated with heparin drip at [facility] discharged with eliquis 5 mg, pt con't to c/o chest discomfort. Pt advised by PCP to come to ed

## 2024-03-13 NOTE — ED ADULT NURSE NOTE - NSFALLUNIVINTERV_ED_ALL_ED
Bed/Stretcher in lowest position, wheels locked, appropriate side rails in place/Call bell, personal items and telephone in reach/Instruct patient to call for assistance before getting out of bed/chair/stretcher/Non-slip footwear applied when patient is off stretcher/[place] to call system/Physically safe environment - no spills, clutter or unnecessary equipment/Purposeful proactive rounding/Room/bathroom lighting operational, light cord in reach

## 2024-03-14 LAB
A1C WITH ESTIMATED AVERAGE GLUCOSE RESULT: 5.4 % — SIGNIFICANT CHANGE UP (ref 4–5.6)
ALBUMIN SERPL ELPH-MCNC: 3.5 G/DL — SIGNIFICANT CHANGE UP (ref 3.5–5.2)
ALBUMIN SERPL ELPH-MCNC: 3.6 G/DL — SIGNIFICANT CHANGE UP (ref 3.5–5.2)
ALP SERPL-CCNC: 41 U/L — SIGNIFICANT CHANGE UP (ref 30–115)
ALP SERPL-CCNC: 63 U/L — SIGNIFICANT CHANGE UP (ref 30–115)
ALT FLD-CCNC: 26 U/L — SIGNIFICANT CHANGE UP (ref 0–41)
ALT FLD-CCNC: 26 U/L — SIGNIFICANT CHANGE UP (ref 0–41)
ANION GAP SERPL CALC-SCNC: 12 MMOL/L — SIGNIFICANT CHANGE UP (ref 7–14)
ANION GAP SERPL CALC-SCNC: 7 MMOL/L — SIGNIFICANT CHANGE UP (ref 7–14)
APTT BLD: 44.7 SEC — HIGH (ref 27–39.2)
APTT BLD: 46.8 SEC — HIGH (ref 27–39.2)
APTT BLD: 53.1 SEC — HIGH (ref 27–39.2)
APTT BLD: 72.6 SEC — CRITICAL HIGH (ref 27–39.2)
AST SERPL-CCNC: 17 U/L — SIGNIFICANT CHANGE UP (ref 0–41)
AST SERPL-CCNC: 18 U/L — SIGNIFICANT CHANGE UP (ref 0–41)
BASOPHILS # BLD AUTO: 0.03 K/UL — SIGNIFICANT CHANGE UP (ref 0–0.2)
BASOPHILS # BLD AUTO: 0.04 K/UL — SIGNIFICANT CHANGE UP (ref 0–0.2)
BASOPHILS NFR BLD AUTO: 0.3 % — SIGNIFICANT CHANGE UP (ref 0–1)
BASOPHILS NFR BLD AUTO: 0.4 % — SIGNIFICANT CHANGE UP (ref 0–1)
BILIRUB SERPL-MCNC: 0.3 MG/DL — SIGNIFICANT CHANGE UP (ref 0.2–1.2)
BILIRUB SERPL-MCNC: 0.4 MG/DL — SIGNIFICANT CHANGE UP (ref 0.2–1.2)
BUN SERPL-MCNC: 22 MG/DL — HIGH (ref 10–20)
BUN SERPL-MCNC: 27 MG/DL — HIGH (ref 10–20)
CALCIUM SERPL-MCNC: 8.7 MG/DL — SIGNIFICANT CHANGE UP (ref 8.4–10.5)
CALCIUM SERPL-MCNC: 8.8 MG/DL — SIGNIFICANT CHANGE UP (ref 8.4–10.5)
CHLORIDE SERPL-SCNC: 107 MMOL/L — SIGNIFICANT CHANGE UP (ref 98–110)
CHLORIDE SERPL-SCNC: 108 MMOL/L — SIGNIFICANT CHANGE UP (ref 98–110)
CHOLEST SERPL-MCNC: 207 MG/DL — HIGH
CO2 SERPL-SCNC: 21 MMOL/L — SIGNIFICANT CHANGE UP (ref 17–32)
CO2 SERPL-SCNC: 21 MMOL/L — SIGNIFICANT CHANGE UP (ref 17–32)
CREAT SERPL-MCNC: 0.8 MG/DL — SIGNIFICANT CHANGE UP (ref 0.7–1.5)
CREAT SERPL-MCNC: 1 MG/DL — SIGNIFICANT CHANGE UP (ref 0.7–1.5)
EGFR: 65 ML/MIN/1.73M2 — SIGNIFICANT CHANGE UP
EGFR: 85 ML/MIN/1.73M2 — SIGNIFICANT CHANGE UP
EOSINOPHIL # BLD AUTO: 0.01 K/UL — SIGNIFICANT CHANGE UP (ref 0–0.7)
EOSINOPHIL # BLD AUTO: 0.06 K/UL — SIGNIFICANT CHANGE UP (ref 0–0.7)
EOSINOPHIL NFR BLD AUTO: 0.1 % — SIGNIFICANT CHANGE UP (ref 0–8)
EOSINOPHIL NFR BLD AUTO: 0.6 % — SIGNIFICANT CHANGE UP (ref 0–8)
ESTIMATED AVERAGE GLUCOSE: 108 MG/DL — SIGNIFICANT CHANGE UP (ref 68–114)
GLUCOSE SERPL-MCNC: 117 MG/DL — HIGH (ref 70–99)
GLUCOSE SERPL-MCNC: 99 MG/DL — SIGNIFICANT CHANGE UP (ref 70–99)
HCT VFR BLD CALC: 41 % — SIGNIFICANT CHANGE UP (ref 37–47)
HCT VFR BLD CALC: 42.2 % — SIGNIFICANT CHANGE UP (ref 37–47)
HDLC SERPL-MCNC: 58 MG/DL — SIGNIFICANT CHANGE UP
HGB BLD-MCNC: 13.8 G/DL — SIGNIFICANT CHANGE UP (ref 12–16)
HGB BLD-MCNC: 13.8 G/DL — SIGNIFICANT CHANGE UP (ref 12–16)
IMM GRANULOCYTES NFR BLD AUTO: 0.5 % — HIGH (ref 0.1–0.3)
IMM GRANULOCYTES NFR BLD AUTO: 0.6 % — HIGH (ref 0.1–0.3)
INR BLD: 1.03 RATIO — SIGNIFICANT CHANGE UP (ref 0.65–1.3)
INR BLD: 1.1 RATIO — SIGNIFICANT CHANGE UP (ref 0.65–1.3)
LIPID PNL WITH DIRECT LDL SERPL: 125 MG/DL — HIGH
LYMPHOCYTES # BLD AUTO: 2.38 K/UL — SIGNIFICANT CHANGE UP (ref 1.2–3.4)
LYMPHOCYTES # BLD AUTO: 2.96 K/UL — SIGNIFICANT CHANGE UP (ref 1.2–3.4)
LYMPHOCYTES # BLD AUTO: 24 % — SIGNIFICANT CHANGE UP (ref 20.5–51.1)
LYMPHOCYTES # BLD AUTO: 31.5 % — SIGNIFICANT CHANGE UP (ref 20.5–51.1)
MAGNESIUM SERPL-MCNC: 1.8 MG/DL — SIGNIFICANT CHANGE UP (ref 1.8–2.4)
MAGNESIUM SERPL-MCNC: 2 MG/DL — SIGNIFICANT CHANGE UP (ref 1.8–2.4)
MCHC RBC-ENTMCNC: 29.7 PG — SIGNIFICANT CHANGE UP (ref 27–31)
MCHC RBC-ENTMCNC: 30.1 PG — SIGNIFICANT CHANGE UP (ref 27–31)
MCHC RBC-ENTMCNC: 32.7 G/DL — SIGNIFICANT CHANGE UP (ref 32–37)
MCHC RBC-ENTMCNC: 33.7 G/DL — SIGNIFICANT CHANGE UP (ref 32–37)
MCV RBC AUTO: 89.5 FL — SIGNIFICANT CHANGE UP (ref 81–99)
MCV RBC AUTO: 90.9 FL — SIGNIFICANT CHANGE UP (ref 81–99)
MONOCYTES # BLD AUTO: 0.78 K/UL — HIGH (ref 0.1–0.6)
MONOCYTES # BLD AUTO: 0.98 K/UL — HIGH (ref 0.1–0.6)
MONOCYTES NFR BLD AUTO: 10.4 % — HIGH (ref 1.7–9.3)
MONOCYTES NFR BLD AUTO: 7.9 % — SIGNIFICANT CHANGE UP (ref 1.7–9.3)
NEUTROPHILS # BLD AUTO: 5.31 K/UL — SIGNIFICANT CHANGE UP (ref 1.4–6.5)
NEUTROPHILS # BLD AUTO: 6.66 K/UL — HIGH (ref 1.4–6.5)
NEUTROPHILS NFR BLD AUTO: 56.5 % — SIGNIFICANT CHANGE UP (ref 42.2–75.2)
NEUTROPHILS NFR BLD AUTO: 67.2 % — SIGNIFICANT CHANGE UP (ref 42.2–75.2)
NON HDL CHOLESTEROL: 149 MG/DL — HIGH
NRBC # BLD: 0 /100 WBCS — SIGNIFICANT CHANGE UP (ref 0–0)
NRBC # BLD: 0 /100 WBCS — SIGNIFICANT CHANGE UP (ref 0–0)
PHOSPHATE SERPL-MCNC: 2.6 MG/DL — SIGNIFICANT CHANGE UP (ref 2.1–4.9)
PLATELET # BLD AUTO: 186 K/UL — SIGNIFICANT CHANGE UP (ref 130–400)
PLATELET # BLD AUTO: 191 K/UL — SIGNIFICANT CHANGE UP (ref 130–400)
PMV BLD: 10.3 FL — SIGNIFICANT CHANGE UP (ref 7.4–10.4)
PMV BLD: 10.7 FL — HIGH (ref 7.4–10.4)
POTASSIUM SERPL-MCNC: 4 MMOL/L — SIGNIFICANT CHANGE UP (ref 3.5–5)
POTASSIUM SERPL-MCNC: 4.1 MMOL/L — SIGNIFICANT CHANGE UP (ref 3.5–5)
POTASSIUM SERPL-SCNC: 4 MMOL/L — SIGNIFICANT CHANGE UP (ref 3.5–5)
POTASSIUM SERPL-SCNC: 4.1 MMOL/L — SIGNIFICANT CHANGE UP (ref 3.5–5)
PROT SERPL-MCNC: 6 G/DL — SIGNIFICANT CHANGE UP (ref 6–8)
PROT SERPL-MCNC: 6.1 G/DL — SIGNIFICANT CHANGE UP (ref 6–8)
PROTHROM AB SERPL-ACNC: 11.7 SEC — SIGNIFICANT CHANGE UP (ref 9.95–12.87)
PROTHROM AB SERPL-ACNC: 12.6 SEC — SIGNIFICANT CHANGE UP (ref 9.95–12.87)
RBC # BLD: 4.58 M/UL — SIGNIFICANT CHANGE UP (ref 4.2–5.4)
RBC # BLD: 4.64 M/UL — SIGNIFICANT CHANGE UP (ref 4.2–5.4)
RBC # FLD: 13.4 % — SIGNIFICANT CHANGE UP (ref 11.5–14.5)
RBC # FLD: 13.4 % — SIGNIFICANT CHANGE UP (ref 11.5–14.5)
SODIUM SERPL-SCNC: 136 MMOL/L — SIGNIFICANT CHANGE UP (ref 135–146)
SODIUM SERPL-SCNC: 140 MMOL/L — SIGNIFICANT CHANGE UP (ref 135–146)
TRIGL SERPL-MCNC: 125 MG/DL — SIGNIFICANT CHANGE UP
TROPONIN T, HIGH SENSITIVITY RESULT: 40 NG/L — HIGH (ref 6–13)
WBC # BLD: 9.41 K/UL — SIGNIFICANT CHANGE UP (ref 4.8–10.8)
WBC # BLD: 9.91 K/UL — SIGNIFICANT CHANGE UP (ref 4.8–10.8)
WBC # FLD AUTO: 9.41 K/UL — SIGNIFICANT CHANGE UP (ref 4.8–10.8)
WBC # FLD AUTO: 9.91 K/UL — SIGNIFICANT CHANGE UP (ref 4.8–10.8)

## 2024-03-14 PROCEDURE — 99223 1ST HOSP IP/OBS HIGH 75: CPT

## 2024-03-14 PROCEDURE — 93010 ELECTROCARDIOGRAM REPORT: CPT

## 2024-03-14 RX ORDER — ALBUTEROL 90 UG/1
2 AEROSOL, METERED ORAL
Qty: 0 | Refills: 0 | DISCHARGE

## 2024-03-14 RX ORDER — ACETAMINOPHEN 500 MG
650 TABLET ORAL EVERY 6 HOURS
Refills: 0 | Status: DISCONTINUED | OUTPATIENT
Start: 2024-03-14 | End: 2024-03-15

## 2024-03-14 RX ORDER — ALBUTEROL 90 UG/1
2 AEROSOL, METERED ORAL EVERY 6 HOURS
Refills: 0 | Status: DISCONTINUED | OUTPATIENT
Start: 2024-03-14 | End: 2024-03-15

## 2024-03-14 RX ORDER — LORATADINE 10 MG/1
10 TABLET ORAL ONCE
Refills: 0 | Status: DISCONTINUED | OUTPATIENT
Start: 2024-03-14 | End: 2024-03-15

## 2024-03-14 RX ORDER — ATORVASTATIN CALCIUM 80 MG/1
40 TABLET, FILM COATED ORAL AT BEDTIME
Refills: 0 | Status: DISCONTINUED | OUTPATIENT
Start: 2024-03-14 | End: 2024-03-15

## 2024-03-14 RX ORDER — METOPROLOL TARTRATE 50 MG
5 TABLET ORAL ONCE
Refills: 0 | Status: COMPLETED | OUTPATIENT
Start: 2024-03-14 | End: 2024-03-14

## 2024-03-14 RX ORDER — FLUTICASONE FUROATE AND VILANTEROL TRIFENATATE 100; 25 UG/1; UG/1
1 POWDER RESPIRATORY (INHALATION)
Refills: 0 | DISCHARGE

## 2024-03-14 RX ORDER — APIXABAN 2.5 MG/1
1 TABLET, FILM COATED ORAL
Refills: 0 | DISCHARGE

## 2024-03-14 RX ORDER — MORPHINE SULFATE 50 MG/1
2 CAPSULE, EXTENDED RELEASE ORAL ONCE
Refills: 0 | Status: DISCONTINUED | OUTPATIENT
Start: 2024-03-14 | End: 2024-03-14

## 2024-03-14 RX ORDER — METOPROLOL TARTRATE 50 MG
25 TABLET ORAL EVERY 8 HOURS
Refills: 0 | Status: DISCONTINUED | OUTPATIENT
Start: 2024-03-14 | End: 2024-03-15

## 2024-03-14 RX ORDER — ASPIRIN/CALCIUM CARB/MAGNESIUM 324 MG
324 TABLET ORAL ONCE
Refills: 0 | Status: COMPLETED | OUTPATIENT
Start: 2024-03-14 | End: 2024-03-14

## 2024-03-14 RX ORDER — PANTOPRAZOLE SODIUM 20 MG/1
40 TABLET, DELAYED RELEASE ORAL
Refills: 0 | Status: DISCONTINUED | OUTPATIENT
Start: 2024-03-14 | End: 2024-03-15

## 2024-03-14 RX ORDER — ASPIRIN/CALCIUM CARB/MAGNESIUM 324 MG
81 TABLET ORAL DAILY
Refills: 0 | Status: DISCONTINUED | OUTPATIENT
Start: 2024-03-15 | End: 2024-03-15

## 2024-03-14 RX ORDER — LANOLIN ALCOHOL/MO/W.PET/CERES
3 CREAM (GRAM) TOPICAL AT BEDTIME
Refills: 0 | Status: DISCONTINUED | OUTPATIENT
Start: 2024-03-14 | End: 2024-03-15

## 2024-03-14 RX ORDER — DILTIAZEM HCL 120 MG
1 CAPSULE, EXT RELEASE 24 HR ORAL
Refills: 0 | DISCHARGE

## 2024-03-14 RX ORDER — BUDESONIDE AND FORMOTEROL FUMARATE DIHYDRATE 160; 4.5 UG/1; UG/1
2 AEROSOL RESPIRATORY (INHALATION)
Refills: 0 | Status: DISCONTINUED | OUTPATIENT
Start: 2024-03-14 | End: 2024-03-15

## 2024-03-14 RX ORDER — SODIUM ZIRCONIUM CYCLOSILICATE 10 G/10G
5 POWDER, FOR SUSPENSION ORAL ONCE
Refills: 0 | Status: COMPLETED | OUTPATIENT
Start: 2024-03-14 | End: 2024-03-14

## 2024-03-14 RX ORDER — TIOTROPIUM BROMIDE 18 UG/1
2 CAPSULE ORAL; RESPIRATORY (INHALATION) DAILY
Refills: 0 | Status: DISCONTINUED | OUTPATIENT
Start: 2024-03-14 | End: 2024-03-15

## 2024-03-14 RX ADMIN — Medication 324 MILLIGRAM(S): at 03:49

## 2024-03-14 RX ADMIN — Medication 5 MILLIGRAM(S): at 01:30

## 2024-03-14 RX ADMIN — HEPARIN SODIUM 1200 UNIT(S)/HR: 5000 INJECTION INTRAVENOUS; SUBCUTANEOUS at 20:49

## 2024-03-14 RX ADMIN — Medication 25 MILLIGRAM(S): at 03:49

## 2024-03-14 RX ADMIN — Medication 30 MILLIGRAM(S): at 00:21

## 2024-03-14 RX ADMIN — MORPHINE SULFATE 4 MILLIGRAM(S): 50 CAPSULE, EXTENDED RELEASE ORAL at 00:21

## 2024-03-14 RX ADMIN — HEPARIN SODIUM 1000 UNIT(S)/HR: 5000 INJECTION INTRAVENOUS; SUBCUTANEOUS at 05:33

## 2024-03-14 RX ADMIN — HEPARIN SODIUM 1000 UNIT(S)/HR: 5000 INJECTION INTRAVENOUS; SUBCUTANEOUS at 12:02

## 2024-03-14 RX ADMIN — SODIUM ZIRCONIUM CYCLOSILICATE 5 GRAM(S): 10 POWDER, FOR SUSPENSION ORAL at 04:45

## 2024-03-14 RX ADMIN — Medication 25 MILLIGRAM(S): at 21:27

## 2024-03-14 RX ADMIN — ATORVASTATIN CALCIUM 40 MILLIGRAM(S): 80 TABLET, FILM COATED ORAL at 21:26

## 2024-03-14 RX ADMIN — Medication 25 MILLIGRAM(S): at 13:42

## 2024-03-14 RX ADMIN — MORPHINE SULFATE 2 MILLIGRAM(S): 50 CAPSULE, EXTENDED RELEASE ORAL at 04:44

## 2024-03-14 RX ADMIN — Medication 3 MILLILITER(S): at 00:21

## 2024-03-14 RX ADMIN — Medication 650 MILLIGRAM(S): at 16:22

## 2024-03-14 NOTE — PATIENT PROFILE ADULT - LIVING ENVIRONMENT
Last office visit on: 4/14/22  Upcoming appointment scheduled on: 4/17/2023     Last refill on: 12/9/22  Unable to refill due to no standard protocol. Routed to clinician to advise.      no

## 2024-03-14 NOTE — H&P ADULT - HISTORY OF PRESENT ILLNESS
59-year-old female past medical history hypertension, asthma, COPD, newly diagnosed with A-fib on Monday started on Eliquis 5 mg presenting to ED for evaluation of chest tightness and shortness of breath since Monday.  Patient states she was seen at [facility] at that time had elevated troponin, was discharged and saw Dr Patiño today who sent patient to ED for potential cardiac cath on Friday.  Patient reporting mild chest heaviness at this time.  Denies fever, chills, syncope.    In ED     T(C): 36.6 (13 Mar 2024 23:57), Max: 36.8 (13 Mar 2024 18:07)  T(F): 97.9 (13 Mar 2024 23:57), Max: 98.2 (13 Mar 2024 18:07)  HR: 119 (13 Mar 2024 23:57) (109 - 119)  BP: 161/100 (13 Mar 2024 23:57) (130/92 - 161/100)  BP(mean): 122 (13 Mar 2024 23:57) (122 - 122)  RR: 25 (13 Mar 2024 23:57) (25 - 25)  SpO2: 98% (13 Mar 2024 23:57) (97% - 98%)    Parameters below as of 13 Mar 2024 23:57  Patient On (Oxygen Delivery Method): room air    EKG afib with RVR    Trop 49->46 , BNP 5320    started on heparin ACS protocol in ED, given Cardizem and admitted to tele.     59-year-old female past medical history hypertension not on any tx, asthma/COPD, newly diagnosed with A-fib on Monday started on Eliquis 5 mg also discharged on prednisone presenting to ED for evaluation of chest tightness and shortness of breath since Monday.  Patient states she was seen at [facility] at that time had elevated troponin, was discharged and saw Dr Patiño today who sent patient to ED for potential cardiac cath on Friday.  Patient reporting sore throat .  Denies fever, chills, syncope, luts, n/v/d/c.     In ED     T(C): 36.6 (13 Mar 2024 23:57), Max: 36.8 (13 Mar 2024 18:07)  T(F): 97.9 (13 Mar 2024 23:57), Max: 98.2 (13 Mar 2024 18:07)  HR: 119 (13 Mar 2024 23:57) (109 - 119)  BP: 161/100 (13 Mar 2024 23:57) (130/92 - 161/100)  BP(mean): 122 (13 Mar 2024 23:57) (122 - 122)  RR: 25 (13 Mar 2024 23:57) (25 - 25)  SpO2: 98% (13 Mar 2024 23:57) (97% - 98%)    Parameters below as of 13 Mar 2024 23:57  Patient On (Oxygen Delivery Method): room air    EKG afib with RVR    Trop 49->46 , BNP 5320    started on heparin ACS protocol in ED, given Cardizem and admitted to tele.

## 2024-03-14 NOTE — CONSULT NOTE ADULT - PROVIDER SPECIALTY LIST ADULT
Cardiology CONSTITUTIONAL: no weakness, fever, or chills.    EYES: no visual changes.    ENT: no rhinorrhea or sore throat.    PULMONARY: no cough or difficulty breathing.   CARDIOVASCULAR: no chest pain or palpitations.   GASTROINTESTINAL:  no abdominal pain, vomiting, or diarrhea. no blood in stool or melena.    GENITOURINARY: no dysuria, frequency, or hematuria. no vaginal bleeding or discharge.    MUSCULOSKELETAL: no neck/back pain. no pain or swelling in legs.    NEURO:  no headache/dizziness.  no syncope.  no tingling/numbness/focal weakness.    ENDOCRINE: no excessive thirst or urination. no significant weight change.

## 2024-03-14 NOTE — H&P ADULT - ASSESSMENT
59-year-old female past medical history hypertension, asthma, COPD, newly diagnosed with A-fib on Monday started on Eliquis 5 mg presenting to ED for evaluation of chest tightness and shortness of breath since Monday admitted to tele for afib with rvr and r/o acs.    #chest pain  #afib with rvr  #HTN  -tele  -heparin  -metoprolol  -statin  -asa   -cs cardio Dr Patiño  -TTE  -lipid panel  -a1c  -possible cath on friday-confirm in am     #Asthma/COPD  -continue inhalers    #MISC  -DVT ppx: heparin ACS protocol  -GI ppx:   -DIET: dash  -Activity: AAT  -Code Status: Full     59-year-old female past medical history hypertension, asthma, COPD, newly diagnosed with A-fib on Monday started on Eliquis 5 mg presenting to ED for evaluation of chest tightness and shortness of breath since Monday admitted to tele for afib with rvr and r/o acs.    #chest heaviness   #afib with rvr  #HO HTN  -tele  -heparin ACS  - start metoprolol ( switch to Cardizem or succinate after tte and cath result), statin, asa   -cs cardio Dr Patiño  -possible cath on friday-confirm in am   -f/u TTE, lipid panel, a1c, tsh     #HO Asthma/COPD  -continue inhalers    #MISC  -DVT ppx: heparin ACS protocol  -GI ppx: ppi am   -DIET: dash  -Activity: AAT  -Code Status: Full

## 2024-03-14 NOTE — H&P ADULT - NSHPPHYSICALEXAM_GEN_ALL_CORE
Attending Attestation (For Attendings USE Only)... CONSTITUTIONAL: Well groomed, no apparent distress  EYES: PERRLA and symmetric, EOMI  ENMT: Oral mucosa with moist membranes. no pharyngeal injection or exudates   NECK: Supple, symmetric and without tracheal deviation   RESP: No respiratory distress, no use of accessory muscles; CTA b/l, no WRR  CV: , +S1S2,irregular; no JVD; no peripheral edema  GI: Soft, NT, ND, no rebound, no guarding;  NEURO: AAOx4, non focal CONSTITUTIONAL: Well groomed, no apparent distress  EYES: PERRLA and symmetric, EOMI  ENMT: Oral mucosa with moist membranes. no pharyngeal injection or exudates   NECK: Supple, symmetric and without tracheal deviation   RESP: No respiratory distress, no use of accessory muscles; CTA b/l, no WRR  CV: , +S1S2,irregular; no JVD; no peripheral edema  GI: Soft, NT, ND, no rebound, no guarding;  NEURO: AAOx4, non focal  SKIN: macular rash both hand

## 2024-03-14 NOTE — H&P ADULT - NSICDXPASTMEDICALHX_GEN_ALL_CORE_FT
PAST MEDICAL HISTORY:  Asthma     Hypertension     Obesity     Uterine mass Benign; s/p hysterectomy

## 2024-03-14 NOTE — H&P ADULT - ATTENDING COMMENTS
***My note supersedes any discrepancies that may be above in the resident's note***    59-year-old female past medical history hypertension, asthma, COPD, newly diagnosed with A-fib on Monday started on Eliquis 5 mg presenting to ED for evaluation of chest tightness and shortness of breath since Monday admitted to tele for afib with rvr and r/o acs.    Gen- middle-age F, NAD, non toxic appearing  Eyes- anicteric sclera, non injected conjunctiva, EOMI  Chest- symmetrical chest rise  Cardiac- non tachycardic  Abdominal- non distended  Ext- no clubbing   Skin- warm. dry, intact    #chest heaviness   #afib with rvr  #HO HTN  -tele  -heparin ACS  - start metoprolol ( switch to Cardizem or succinate after tte and cath result), statin, asa   -cs cardio Dr Patiño  -possible cath on friday-confirm in am   -f/u TTE, lipid panel, a1c, tsh     #HO Asthma/COPD  -continue inhalers    #MISC  -DVT ppx: heparin ACS protocol  -GI ppx: ppi am   -DIET: dash  -Activity: AAT  -Code Status: Full

## 2024-03-14 NOTE — CONSULT NOTE ADULT - SUBJECTIVE AND OBJECTIVE BOX
Patient is a 59y old  Female who presents with a chief complaint of chest tightness (14 Mar 2024 00:28)      HPI:   59-year-old female past medical history hypertension not on any tx, asthma/COPD, newly diagnosed with A-fib on Monday started on Eliquis 5 mg also discharged on prednisone presenting to ED for evaluation of chest tightness and shortness of breath since Monday.  Patient states she was seen at [facility] at that time had elevated troponin, was discharged and saw Dr Patiño today who sent patient to ED for potential cardiac cath on Friday.  Patient reporting sore throat .  Denies fever, chills, syncope, luts, n/v/d/c.     In ED     T(C): 36.6 (13 Mar 2024 23:57), Max: 36.8 (13 Mar 2024 18:07)  T(F): 97.9 (13 Mar 2024 23:57), Max: 98.2 (13 Mar 2024 18:07)  HR: 119 (13 Mar 2024 23:57) (109 - 119)  BP: 161/100 (13 Mar 2024 23:57) (130/92 - 161/100)  BP(mean): 122 (13 Mar 2024 23:57) (122 - 122)  RR: 25 (13 Mar 2024 23:57) (25 - 25)  SpO2: 98% (13 Mar 2024 23:57) (97% - 98%)    Parameters below as of 13 Mar 2024 23:57  Patient On (Oxygen Delivery Method): room air    EKG afib with RVR    Trop 49->46 , BNP 5320    started on heparin ACS protocol in ED, given Cardizem and admitted to tele.    (14 Mar 2024 00:28)      PAST MEDICAL & SURGICAL HISTORY:  Asthma      Hypertension      Obesity      Uterine mass  Benign; s/p hysterectomy      History of salpingoophorectomy          PREVIOUS DIAGNOSTIC TESTING:      ECHO  FINDINGS:    STRESS  FINDINGS:    CATHETERIZATION  FINDINGS:    MEDICATIONS  (STANDING):  albuterol    90 MICROgram(s) HFA Inhaler 2 Puff(s) Inhalation every 6 hours  atorvastatin 40 milliGRAM(s) Oral at bedtime  budesonide 160 MICROgram(s)/formoterol 4.5 MICROgram(s) Inhaler 2 Puff(s) Inhalation two times a day  heparin  Infusion.  Unit(s)/Hr (10 mL/Hr) IV Continuous <Continuous>  loratadine 10 milliGRAM(s) Oral once  metoprolol tartrate 25 milliGRAM(s) Oral every 8 hours  pantoprazole    Tablet 40 milliGRAM(s) Oral before breakfast  tiotropium 2.5 MICROgram(s) Inhaler 2 Puff(s) Inhalation daily    MEDICATIONS  (PRN):  acetaminophen     Tablet .. 650 milliGRAM(s) Oral every 6 hours PRN Temp greater or equal to 38C (100.4F), Mild Pain (1 - 3)  aluminum hydroxide/magnesium hydroxide/simethicone Suspension 30 milliLiter(s) Oral every 4 hours PRN Dyspepsia  heparin   Injectable 4000 Unit(s) IV Push every 6 hours PRN For aPTT less than 40  melatonin 3 milliGRAM(s) Oral at bedtime PRN Insomnia      FAMILY HISTORY:  FHx: colon cancer (Mother)    FHx: diabetes mellitus (Father)    FHx: cardiovascular disease (Father)        SOCIAL HISTORY:  CIGARETTES:    ALCOHOL:    REVIEW OF SYSTEMS:  CONSTITUTIONAL: No fever, weight loss, or fatigue  NECK: No pain or stiffness  RESPIRATORY: No cough, wheezing, chills or hemoptysis; No shortness of breath  CARDIOVASCULAR: No chest pain, palpitations, dizziness, or leg swelling  GASTROINTESTINAL: No abdominal or epigastric pain. No nausea, vomiting, or hematemesis; No diarrhea or constipation. No melena or hematochezia.  GENITOURINARY: No dysuria, frequency, hematuria, or incontinence  NEUROLOGICAL: No headaches, memory loss, loss of strength, numbness, or tremors  SKIN: No itching, burning, rashes, or lesions   ENDOCRINE: No heat or cold intolerance; No hair loss  MUSCULOSKELETAL: No joint pain or swelling; No muscle, back, or extremity pain  HEME/LYMPH: No easy bruising, or bleeding gums          Vital Signs Last 24 Hrs  T(C): 36.6 (13 Mar 2024 23:57), Max: 36.8 (13 Mar 2024 18:07)  T(F): 97.9 (13 Mar 2024 23:57), Max: 98.2 (13 Mar 2024 18:07)  HR: 77 (14 Mar 2024 05:58) (77 - 130)  BP: 133/85 (14 Mar 2024 05:58) (130/92 - 161/100)  BP(mean): 122 (13 Mar 2024 23:57) (122 - 122)  RR: 18 (14 Mar 2024 01:30) (18 - 25)  SpO2: 98% (14 Mar 2024 01:30) (97% - 98%)    Parameters below as of 14 Mar 2024 01:30  Patient On (Oxygen Delivery Method): room air            PHYSICAL EXAM:  GENERAL: NAD, well-groomed, well-developed  HEAD:  Atraumatic, Normocephalic  NECK: Supple, No JVD, Normal thyroid  NERVOUS SYSTEM:  Alert & Oriented X3, Good concentration  CHEST/LUNG: Clear to percussion bilaterally; No rales, rhonchi, wheezing, or rubs  HEART: Regular rate and rhythm; No murmurs, rubs, or gallops  ABDOMEN: Soft, Nontender, Nondistended; Bowel sounds present  EXTREMITIES:  2+ Peripheral Pulses, No clubbing, cyanosis, or edema  SKIN: No rashes or lesions    INTERPRETATION OF TELEMETRY:    ECG:    I&O's Detail      LABS:                        14.4   13.10 )-----------( 240      ( 13 Mar 2024 20:37 )             42.9     03-14    136  |  108  |  27<H>  ----------------------------<  99  4.0   |  21  |  0.8    Ca    8.7      14 Mar 2024 03:56  Phos  2.6     03-14  Mg     2.0     03-14    TPro  6.0  /  Alb  3.6  /  TBili  0.3  /  DBili  x   /  AST  17  /  ALT  26  /  AlkPhos  41  03-14        PT/INR - ( 14 Mar 2024 03:56 )   PT: 12.60 sec;   INR: 1.10 ratio         PTT - ( 14 Mar 2024 03:56 )  PTT:72.6 sec  Urinalysis Basic - ( 14 Mar 2024 03:56 )    Color: x / Appearance: x / SG: x / pH: x  Gluc: 99 mg/dL / Ketone: x  / Bili: x / Urobili: x   Blood: x / Protein: x / Nitrite: x   Leuk Esterase: x / RBC: x / WBC x   Sq Epi: x / Non Sq Epi: x / Bacteria: x      I&O's Summary      RADIOLOGY & ADDITIONAL STUDIES:        metoprolol tartrate 25 milliGRAM(s) Oral every 8 hours     Patient is a 59y old  Female who presents with a chief complaint of chest tightness (14 Mar 2024 00:28)      HPI:   59-year-old female past medical history hypertension not on any tx, asthma/COPD, newly diagnosed with A-fib on Monday started on Eliquis 5 mg also discharged on prednisone presenting to ED for evaluation of chest tightness and shortness of breath since Monday.  Patient states she was seen at [facility] at that time had elevated troponin, was discharged and saw Dr Patiño today who sent patient to ED for potential cardiac cath on Friday.  Patient reporting sore throat .  Denies fever, chills, syncope, luts, n/v/d/c.     In ED     T(C): 36.6 (13 Mar 2024 23:57), Max: 36.8 (13 Mar 2024 18:07)  T(F): 97.9 (13 Mar 2024 23:57), Max: 98.2 (13 Mar 2024 18:07)  HR: 119 (13 Mar 2024 23:57) (109 - 119)  BP: 161/100 (13 Mar 2024 23:57) (130/92 - 161/100)  BP(mean): 122 (13 Mar 2024 23:57) (122 - 122)  RR: 25 (13 Mar 2024 23:57) (25 - 25)  SpO2: 98% (13 Mar 2024 23:57) (97% - 98%)    Parameters below as of 13 Mar 2024 23:57  Patient On (Oxygen Delivery Method): room air    EKG afib with RVR    Trop 49->46 , BNP 5320    started on heparin ACS protocol in ED, given Cardizem and admitted to tele.    (14 Mar 2024 00:28)      PAST MEDICAL & SURGICAL HISTORY:  Asthma      Hypertension      Obesity      Uterine mass  Benign; s/p hysterectomy      History of salpingoophorectomy          PREVIOUS DIAGNOSTIC TESTING:      ECHO  FINDINGS:    STRESS  FINDINGS:    CATHETERIZATION  FINDINGS:    MEDICATIONS  (STANDING):  albuterol    90 MICROgram(s) HFA Inhaler 2 Puff(s) Inhalation every 6 hours  atorvastatin 40 milliGRAM(s) Oral at bedtime  budesonide 160 MICROgram(s)/formoterol 4.5 MICROgram(s) Inhaler 2 Puff(s) Inhalation two times a day  heparin  Infusion.  Unit(s)/Hr (10 mL/Hr) IV Continuous <Continuous>  loratadine 10 milliGRAM(s) Oral once  metoprolol tartrate 25 milliGRAM(s) Oral every 8 hours  pantoprazole    Tablet 40 milliGRAM(s) Oral before breakfast  tiotropium 2.5 MICROgram(s) Inhaler 2 Puff(s) Inhalation daily    MEDICATIONS  (PRN):  acetaminophen     Tablet .. 650 milliGRAM(s) Oral every 6 hours PRN Temp greater or equal to 38C (100.4F), Mild Pain (1 - 3)  aluminum hydroxide/magnesium hydroxide/simethicone Suspension 30 milliLiter(s) Oral every 4 hours PRN Dyspepsia  heparin   Injectable 4000 Unit(s) IV Push every 6 hours PRN For aPTT less than 40  melatonin 3 milliGRAM(s) Oral at bedtime PRN Insomnia      FAMILY HISTORY:  FHx: colon cancer (Mother)    FHx: diabetes mellitus (Father)    FHx: cardiovascular disease (Father)        SOCIAL HISTORY:  CIGARETTES: Current smoker     ALCOHOL: Social     REVIEW OF SYSTEMS:  CONSTITUTIONAL: No fever, weight loss, or fatigue  NECK: No pain or stiffness  RESPIRATORY: No cough, wheezing, chills or hemoptysis; No shortness of breath  CARDIOVASCULAR: No chest pain or palpitations, dizziness, or leg swelling  GASTROINTESTINAL: No abdominal or epigastric pain. No nausea, vomiting, or hematemesis; No diarrhea or constipation. No melena or hematochezia.  GENITOURINARY: No dysuria, frequency, hematuria, or incontinence  NEUROLOGICAL: No headaches, memory loss, loss of strength, numbness, or tremors  SKIN: No itching, burning, rashes, or lesions   ENDOCRINE: No heat or cold intolerance; No hair loss  MUSCULOSKELETAL: No joint pain or swelling; No muscle, back, or extremity pain  HEME/LYMPH: No easy bruising, or bleeding gums          Vital Signs Last 24 Hrs  T(C): 36.6 (13 Mar 2024 23:57), Max: 36.8 (13 Mar 2024 18:07)  T(F): 97.9 (13 Mar 2024 23:57), Max: 98.2 (13 Mar 2024 18:07)  HR: 77 (14 Mar 2024 05:58) (77 - 130)  BP: 133/85 (14 Mar 2024 05:58) (130/92 - 161/100)  BP(mean): 122 (13 Mar 2024 23:57) (122 - 122)  RR: 18 (14 Mar 2024 01:30) (18 - 25)  SpO2: 98% (14 Mar 2024 01:30) (97% - 98%)    Parameters below as of 14 Mar 2024 01:30  Patient On (Oxygen Delivery Method): room air            PHYSICAL EXAM:  GENERAL: NAD, well-groomed, well-developed  HEAD:  Atraumatic, Normocephalic  NECK: Supple, No JVD, Normal thyroid  NERVOUS SYSTEM:  Alert & Oriented X3, Good concentration  CHEST/LUNG: Clear to percussion bilaterally; No rales, rhonchi, wheezing, or rubs  HEART: Regular rate and rhythm; + SEmurmur. No rubs, or gallops  ABDOMEN: Soft, Nontender, Nondistended; Bowel sounds present  EXTREMITIES:  No clubbing, cyanosis, or edema  SKIN: No rashes or lesions    INTERPRETATION OF TELEMETRY:    ECG:    I&O's Detail      LABS:                        14.4   13.10 )-----------( 240      ( 13 Mar 2024 20:37 )             42.9     03-14    136  |  108  |  27<H>  ----------------------------<  99  4.0   |  21  |  0.8    Ca    8.7      14 Mar 2024 03:56  Phos  2.6     03-14  Mg     2.0     03-14    TPro  6.0  /  Alb  3.6  /  TBili  0.3  /  DBili  x   /  AST  17  /  ALT  26  /  AlkPhos  41  03-14        PT/INR - ( 14 Mar 2024 03:56 )   PT: 12.60 sec;   INR: 1.10 ratio         PTT - ( 14 Mar 2024 03:56 )  PTT:72.6 sec  Urinalysis Basic - ( 14 Mar 2024 03:56 )    Color: x / Appearance: x / SG: x / pH: x  Gluc: 99 mg/dL / Ketone: x  / Bili: x / Urobili: x   Blood: x / Protein: x / Nitrite: x   Leuk Esterase: x / RBC: x / WBC x   Sq Epi: x / Non Sq Epi: x / Bacteria: x      I&O's Summary      RADIOLOGY & ADDITIONAL STUDIES:        metoprolol tartrate 25 milliGRAM(s) Oral every 8 hours

## 2024-03-14 NOTE — H&P ADULT - NSHPLABSRESULTS_GEN_ALL_CORE
LABS:                        14.4   13.10 )-----------( 240      ( 13 Mar 2024 20:37 )             42.9     03-13    140  |  108  |  27<H>  ----------------------------<  95  5.5<H>   |  21  |  0.9    Ca    9.5      13 Mar 2024 20:37    TPro  6.9  /  Alb  4.1  /  TBili  0.3  /  DBili  x   /  AST  30  /  ALT  36  /  AlkPhos  51  03-13    PT/INR - ( 13 Mar 2024 20:37 )   PT: 13.00 sec;   INR: 1.14 ratio         PTT - ( 13 Mar 2024 20:37 )  PTT:29.0 sec

## 2024-03-14 NOTE — PATIENT PROFILE ADULT - FALL HARM RISK - HARM RISK INTERVENTIONS

## 2024-03-14 NOTE — PATIENT PROFILE ADULT - FUNCTIONAL ASSESSMENT - DAILY ACTIVITY 3.
no loss of consciousness, no gait abnormality, no headache, no sensory deficits, and no weakness.
4 = No assist / stand by assistance

## 2024-03-14 NOTE — CONSULT NOTE ADULT - ASSESSMENT
Current smoker   COPD/Asthma   Obesity   AF   NSTEMI       Continue current care   AC (IV heparin)   NPO after mid night on Thursday for possible C   EP evaluation   DVT/GI prophylaxis   Smoking cessation   Pulmonary evaluation to R/O NEIL   Will F/O

## 2024-03-14 NOTE — PATIENT PROFILE ADULT - ARE SIGNIFICANT INDICATORS COMPLETE.
Ear Cerumen Removal  Date/Time: 11/20/2018 4:12 PM  Performed by: Abdiel Sanders MD  Authorized by: Abdiel Sanders MD     Consent Done?:  Yes (Verbal)  Location details:  Both ears  Cerumen  Removal Results:  Cerumen completely removed  Patient tolerance:  Patient tolerated the procedure well with no immediate complications     Binocular microscopy used to examine ear canal and tympanic membrane.  There was a cerumen impaction on the right side and an impaction of cerumen on the left.  This was removed using a curette and other microinstrumentation.          Yes No

## 2024-03-15 ENCOUNTER — TRANSCRIPTION ENCOUNTER (OUTPATIENT)
Age: 59
End: 2024-03-15

## 2024-03-15 VITALS
HEART RATE: 80 BPM | SYSTOLIC BLOOD PRESSURE: 141 MMHG | RESPIRATION RATE: 16 BRPM | OXYGEN SATURATION: 98 % | DIASTOLIC BLOOD PRESSURE: 86 MMHG

## 2024-03-15 LAB
ALBUMIN SERPL ELPH-MCNC: 3.7 G/DL — SIGNIFICANT CHANGE UP (ref 3.5–5.2)
ALP SERPL-CCNC: 50 U/L — SIGNIFICANT CHANGE UP (ref 30–115)
ALT FLD-CCNC: 26 U/L — SIGNIFICANT CHANGE UP (ref 0–41)
ANION GAP SERPL CALC-SCNC: 11 MMOL/L — SIGNIFICANT CHANGE UP (ref 7–14)
APTT BLD: 65.5 SEC — HIGH (ref 27–39.2)
APTT BLD: 74.6 SEC — CRITICAL HIGH (ref 27–39.2)
APTT BLD: 76.4 SEC — CRITICAL HIGH (ref 27–39.2)
AST SERPL-CCNC: 15 U/L — SIGNIFICANT CHANGE UP (ref 0–41)
BASOPHILS # BLD AUTO: 0.06 K/UL — SIGNIFICANT CHANGE UP (ref 0–0.2)
BASOPHILS NFR BLD AUTO: 0.7 % — SIGNIFICANT CHANGE UP (ref 0–1)
BILIRUB SERPL-MCNC: 0.5 MG/DL — SIGNIFICANT CHANGE UP (ref 0.2–1.2)
BUN SERPL-MCNC: 18 MG/DL — SIGNIFICANT CHANGE UP (ref 10–20)
CALCIUM SERPL-MCNC: 9.3 MG/DL — SIGNIFICANT CHANGE UP (ref 8.4–10.5)
CHLORIDE SERPL-SCNC: 104 MMOL/L — SIGNIFICANT CHANGE UP (ref 98–110)
CO2 SERPL-SCNC: 24 MMOL/L — SIGNIFICANT CHANGE UP (ref 17–32)
CREAT SERPL-MCNC: 0.9 MG/DL — SIGNIFICANT CHANGE UP (ref 0.7–1.5)
EGFR: 74 ML/MIN/1.73M2 — SIGNIFICANT CHANGE UP
EOSINOPHIL # BLD AUTO: 0.15 K/UL — SIGNIFICANT CHANGE UP (ref 0–0.7)
EOSINOPHIL NFR BLD AUTO: 1.8 % — SIGNIFICANT CHANGE UP (ref 0–8)
GLUCOSE SERPL-MCNC: 82 MG/DL — SIGNIFICANT CHANGE UP (ref 70–99)
HCT VFR BLD CALC: 45.2 % — SIGNIFICANT CHANGE UP (ref 37–47)
HGB BLD-MCNC: 15.3 G/DL — SIGNIFICANT CHANGE UP (ref 12–16)
IMM GRANULOCYTES NFR BLD AUTO: 0.9 % — HIGH (ref 0.1–0.3)
LYMPHOCYTES # BLD AUTO: 2.79 K/UL — SIGNIFICANT CHANGE UP (ref 1.2–3.4)
LYMPHOCYTES # BLD AUTO: 33.1 % — SIGNIFICANT CHANGE UP (ref 20.5–51.1)
MAGNESIUM SERPL-MCNC: 1.8 MG/DL — SIGNIFICANT CHANGE UP (ref 1.8–2.4)
MCHC RBC-ENTMCNC: 30.3 PG — SIGNIFICANT CHANGE UP (ref 27–31)
MCHC RBC-ENTMCNC: 33.8 G/DL — SIGNIFICANT CHANGE UP (ref 32–37)
MCV RBC AUTO: 89.5 FL — SIGNIFICANT CHANGE UP (ref 81–99)
MONOCYTES # BLD AUTO: 1.02 K/UL — HIGH (ref 0.1–0.6)
MONOCYTES NFR BLD AUTO: 12.1 % — HIGH (ref 1.7–9.3)
NEUTROPHILS # BLD AUTO: 4.33 K/UL — SIGNIFICANT CHANGE UP (ref 1.4–6.5)
NEUTROPHILS NFR BLD AUTO: 51.4 % — SIGNIFICANT CHANGE UP (ref 42.2–75.2)
NRBC # BLD: 0 /100 WBCS — SIGNIFICANT CHANGE UP (ref 0–0)
PLATELET # BLD AUTO: 189 K/UL — SIGNIFICANT CHANGE UP (ref 130–400)
PMV BLD: 10 FL — SIGNIFICANT CHANGE UP (ref 7.4–10.4)
POTASSIUM SERPL-MCNC: 3.9 MMOL/L — SIGNIFICANT CHANGE UP (ref 3.5–5)
POTASSIUM SERPL-SCNC: 3.9 MMOL/L — SIGNIFICANT CHANGE UP (ref 3.5–5)
PROT SERPL-MCNC: 6.3 G/DL — SIGNIFICANT CHANGE UP (ref 6–8)
RBC # BLD: 5.05 M/UL — SIGNIFICANT CHANGE UP (ref 4.2–5.4)
RBC # FLD: 13.2 % — SIGNIFICANT CHANGE UP (ref 11.5–14.5)
SODIUM SERPL-SCNC: 139 MMOL/L — SIGNIFICANT CHANGE UP (ref 135–146)
WBC # BLD: 8.43 K/UL — SIGNIFICANT CHANGE UP (ref 4.8–10.8)
WBC # FLD AUTO: 8.43 K/UL — SIGNIFICANT CHANGE UP (ref 4.8–10.8)

## 2024-03-15 PROCEDURE — 99239 HOSP IP/OBS DSCHRG MGMT >30: CPT

## 2024-03-15 RX ORDER — ATORVASTATIN CALCIUM 80 MG/1
1 TABLET, FILM COATED ORAL
Qty: 60 | Refills: 0
Start: 2024-03-15 | End: 2024-05-13

## 2024-03-15 RX ORDER — SODIUM CHLORIDE 9 MG/ML
1000 INJECTION INTRAMUSCULAR; INTRAVENOUS; SUBCUTANEOUS
Refills: 0 | Status: DISCONTINUED | OUTPATIENT
Start: 2024-03-15 | End: 2024-03-15

## 2024-03-15 RX ORDER — SODIUM CHLORIDE 9 MG/ML
250 INJECTION INTRAMUSCULAR; INTRAVENOUS; SUBCUTANEOUS ONCE
Refills: 0 | Status: COMPLETED | OUTPATIENT
Start: 2024-03-15 | End: 2024-03-15

## 2024-03-15 RX ORDER — METOPROLOL TARTRATE 50 MG
3 TABLET ORAL
Qty: 180 | Refills: 0
Start: 2024-03-15 | End: 2024-05-13

## 2024-03-15 RX ORDER — APIXABAN 2.5 MG/1
5 TABLET, FILM COATED ORAL EVERY 12 HOURS
Refills: 0 | Status: DISCONTINUED | OUTPATIENT
Start: 2024-03-15 | End: 2024-03-15

## 2024-03-15 RX ORDER — METOPROLOL TARTRATE 50 MG
75 TABLET ORAL DAILY
Refills: 0 | Status: DISCONTINUED | OUTPATIENT
Start: 2024-03-15 | End: 2024-03-15

## 2024-03-15 RX ORDER — ASPIRIN/CALCIUM CARB/MAGNESIUM 324 MG
1 TABLET ORAL
Qty: 60 | Refills: 0
Start: 2024-03-15 | End: 2024-05-13

## 2024-03-15 RX ADMIN — Medication 81 MILLIGRAM(S): at 12:00

## 2024-03-15 RX ADMIN — HEPARIN SODIUM 1100 UNIT(S)/HR: 5000 INJECTION INTRAVENOUS; SUBCUTANEOUS at 04:05

## 2024-03-15 RX ADMIN — Medication 25 MILLIGRAM(S): at 05:41

## 2024-03-15 RX ADMIN — PANTOPRAZOLE SODIUM 40 MILLIGRAM(S): 20 TABLET, DELAYED RELEASE ORAL at 05:42

## 2024-03-15 RX ADMIN — Medication 650 MILLIGRAM(S): at 06:37

## 2024-03-15 RX ADMIN — SODIUM CHLORIDE 250 MILLILITER(S): 9 INJECTION INTRAMUSCULAR; INTRAVENOUS; SUBCUTANEOUS at 12:15

## 2024-03-15 RX ADMIN — Medication 650 MILLIGRAM(S): at 05:55

## 2024-03-15 RX ADMIN — HEPARIN SODIUM 1000 UNIT(S)/HR: 5000 INJECTION INTRAVENOUS; SUBCUTANEOUS at 11:26

## 2024-03-15 RX ADMIN — SODIUM CHLORIDE 150 MILLILITER(S): 9 INJECTION INTRAMUSCULAR; INTRAVENOUS; SUBCUTANEOUS at 13:36

## 2024-03-15 NOTE — CHART NOTE - NSCHARTNOTEFT_GEN_A_CORE
PREOPERATIVE DAY OF PROCEDURE EVALUATION:  I have personally seen and examined the patient.  I agree with the history and physical which I have reviewed and noted any changes below.     59-year-old F, former smoker, with PMHx of HTN (not on any meds), COPD, newly diagnosed with A-fib on Monday when she presented to [facility] with severe SOB and diaphoresis, unreleieved by her nebs/inhalers, was started on Eliquis 5mg and also discharged home on prednisone.  Pt also states that she was found to have positive troponin, followed up by her cardiologist in the office, who sent her to the ED for evaluation of chest tightness and SOB since Monday, admitted to tele for afib with rvr and r/o acs, was started on heparin gtt as per ACS protocol.    Bleeding Risk Score:  1.1%  IVF pre-hydration: 250cc NS bolus  -ASA 324mg pox 1 yesterday, 81mg po x 1 now   -started on BB  -Heparin gtt held since 11:50am  -last dose of Eliquis on Wed 3/13 am    VS: 135/104, 101, 18, 97% on RA  Right Ezekiel: positive  3/14 ECG: AFib @ 85 bpm       (Signed electronically by __________)  03-15-24 @ 11:57

## 2024-03-15 NOTE — DISCHARGE NOTE PROVIDER - ATTENDING DISCHARGE PHYSICAL EXAMINATION:
Gen- middle-age F, NAD, non toxic appearing  Eyes- anicteric sclera, non injected conjunctiva, EOMI  Chest- symmetrical chest rise  Cardiac- non tachycardic  Abdominal- non distended  Ext- no clubbing   Skin- warm. dry, intact

## 2024-03-15 NOTE — CHART NOTE - NSCHARTNOTEFT_GEN_A_CORE
PRE-OP DIAGNOSIS:    Stable Angina    PROCEDURE:     [x] Coronary Angiogram     [x] LHC     [] LVG     [] RHC     [] Intervention (see below)         PHYSICIAN:  Dr Patiño    ASSISTANT: Dr DEANNE Burt     PROCEDURE DESCRIPTION:     Consent:      [x] Patient     [] Family Member     []  Used        Anesthesia:     [] General     [x] Sedation     [x] Local        Access & Closure:     [x] 6 Fr Right Radial Artery (D stat closure)     [] Fr Femoral Artery     [] Fr Femoral Vein     [] Fr Brachial Vein       IV Contrast: 40 mL        Intervention: None      Implants: None       FINDINGS:     Coronary Dominance: Co Dominant System      LM: No disease    LAD: Mild disease. Distal vessel wraps around the apex.   D1 Minor luminal irregularities    CX: Minor luminal irregularities  OM1 Minor luminal irregularities    RCA: Minor luminal irregularities       LVEDP: 14 mmHg     EF: 60 %        ESTIMATED BLOOD LOSS: < 10 mL        CONDITION:     [x] Good     [] Fair     [] Critical        SPECIMEN REMOVED: N/A       POST-OP DIAGNOSIS:      [x] Mild Coronary Artery Disease (< 50% stenosis)       PLAN OF CARE:     [x] D/C Home Today     [x] Medications: Aspirin 81 mg qd, Lipitor 40mg qd, switch lopressor to Toprol 75mg XL qd. Resume Eliquis from tonight.     [x] IV Fluids: IV NS @ 150cc/hr for 2 hours

## 2024-03-15 NOTE — DISCHARGE NOTE PROVIDER - HOSPITAL COURSE
59-year-old female past medical history hypertension not on any tx, asthma/COPD, newly diagnosed with A-fib on Monday started on Eliquis 5 mg also discharged on prednisone presenting to ED for evaluation of chest tightness and shortness of breath since Monday.  Patient states she was seen at [facility] at that time had elevated troponin, was discharged and saw Dr Patiño today who sent patient to ED for potential cardiac cath on Friday.  Patient reporting sore throat .  Denies fever, chills, syncope, luts, n/v/d/c.     In ED   T(C): 36.6 (13 Mar 2024 23:57), Max: 36.8 (13 Mar 2024 18:07)  T(F): 97.9 (13 Mar 2024 23:57), Max: 98.2 (13 Mar 2024 18:07)  HR: 119 (13 Mar 2024 23:57) (109 - 119)  BP: 161/100 (13 Mar 2024 23:57) (130/92 - 161/100)  BP(mean): 122 (13 Mar 2024 23:57) (122 - 122)  RR: 25 (13 Mar 2024 23:57) (25 - 25)  SpO2: 98% (13 Mar 2024 23:57) (97% - 98%)    Parameters below as of 13 Mar 2024 23:57  Patient On (Oxygen Delivery Method): room air  EKG afib with RVR  Trop 49->46 , BNP 5320  started on heparin ACS protocol in ED, given Cardizem and admitted to tele.   #chest heaviness   #afib with rvr  #HO HTN  -heparin ACS  - start metoprolol ( switch to Cardizem or succinate after tte and cath result), statin, asa   -cs cardio Dr Patiño  -s/p [facility] Mild Coronary Artery Disease (< 50% stenosis)  -Medications: Aspirin 81 mg qd, Lipitor 40mg qd, switch lopressor to Toprol 75mg XL qd. Resume Eliquis from tonight.     #HO Asthma/COPD  -continue inhalers

## 2024-03-15 NOTE — DISCHARGE NOTE PROVIDER - YES NO FOR MLM POSITIVE OR NEGATIVE COVID RESULT
Problem: Patient Care Overview (Adult)  Goal: Plan of Care Review  Outcome: Ongoing (interventions implemented as appropriate)    01/05/17 1659   Coping/Psychosocial Response Interventions   Plan Of Care Reviewed With patient   Outcome Evaluation   Outcome Summary/Follow up Plan PT eval completed. PT will follow to improve balance, to progress functional mobility with NWB status and to promote return to PLOF with increased independence and safety. Attempt knee walker tomorrow if pt agreeable, refused today. Plan is home with assist          Problem: Inpatient Physical Therapy  Goal: Bed Mobility Goal LTG- PT  Outcome: Ongoing (interventions implemented as appropriate)    01/05/17 1659   Bed Mobility PT LTG   Bed Mobility PT LTG, Date Established 01/05/17   Bed Mobility PT LTG, Time to Achieve 5 days   Bed Mobility PT LTG, Activity Type supine to sit/sit to supine   Bed Mobility PT LTG, [place] Level independent       Goal: Transfer Training Goal 1 LTG- PT  Outcome: Ongoing (interventions implemented as appropriate)    01/05/17 1659   Transfer Training PT LTG   Transfer Training PT LTG, Date Established 01/05/17   Transfer Training PT LTG, Time to Achieve 5 days   Transfer Training PT LTG, Activity Type sit to stand/stand to sit   Transfer Training PT LTG, [place] Level conditional independence   Transfer Training PT LTG, Assist Device walker, rolling       Goal: Gait Training Goal LTG- PT  Outcome: Ongoing (interventions implemented as appropriate)    01/05/17 1659   Gait Training PT LTG   Gait Training Goal PT LTG, Date Established 01/05/17   Gait Training Goal PT LTG, Time to Achieve 5 days   Gait Training Goal PT LTG, [place] Level conditional independence   Gait Training Goal PT LTG, Assist Device walker, rolling   Gait Training Goal PT LTG, Distance to Achieve 150 feet       Goal: Stair Training Goal LTG- PT  Outcome: Ongoing (interventions implemented as appropriate)    01/05/17 1659   Stair  Training PT LTG   Stair Training Goal PT LTG, Date Established 01/05/17   Stair Training Goal PT LTG, Time to Achieve 5 days   Stair Training Goal PT LTG, Number of Steps 1   Stair Training Goal PT LTG, [place] Level supervision required   Stair Training Goal PT LTG, Assist Device walker, rolling  (backward)            ,

## 2024-03-15 NOTE — DISCHARGE NOTE NURSING/CASE MANAGEMENT/SOCIAL WORK - PATIENT PORTAL LINK FT
You can access the FollowMyHealth Patient Portal offered by [facility] by registering at the following website: [email or web address]. By joining [facility]’s FollowMyHealth portal, you will also be able to view your health information using other applications (apps) compatible with our system.

## 2024-03-15 NOTE — DISCHARGE NOTE PROVIDER - NSDCCPCAREPLAN_GEN_ALL_CORE_FT
PRINCIPAL DISCHARGE DIAGNOSIS  Diagnosis: Chest pain  Assessment and Plan of Treatment: Chest pain can be caused by many different conditions which may or may not be dangerous. Causes include heartburn, lung infections, heart attack, blood clot in lungs, skin infections, strain or damage to muscle, cartilage, or bones, etc. In addition to a history and physical examination, an electrocardiogram (ECG) or other lab tests may have been performed to determine the cause of your chest pain. Follow up with your primary care provider or with a cardiologist as instructed.   SEEK IMMEDIATE MEDICAL CARE IF YOU HAVE ANY OF THE FOLLOWING SYMPTOMS: worsening chest pain, coughing up blood, unexplained back/neck/jaw pain, severe abdominal pain, dizziness or lightheadedness, fainting, shortness of breath, sweaty or clammy skin, vomiting, or racing heart beat. These symptoms may represent a serious problem that is an emergency. Do not wait to see if the symptoms will go away. Get medical help right away. Call 911 and do not drive yourself to the hospital.

## 2024-03-15 NOTE — DISCHARGE NOTE PROVIDER - CARE PROVIDER_API CALL
Gurwinder Patiño  Interventional Cardiology  [address] [place] [id number]  Phone: [phone]  Fax: [phone]  Follow Up Time: 2 weeks

## 2024-03-15 NOTE — DISCHARGE NOTE PROVIDER - NSDCMRMEDTOKEN_GEN_ALL_CORE_FT
aspirin 81 mg oral tablet, chewable: 1 tab(s) orally once a day  atorvastatin 40 mg oral tablet: 1 tab(s) orally once a day (at bedtime)  Breo Ellipta 100 mcg-25 mcg/inh inhalation powder: 1 puff(s) inhaled once a day  Cardizem  mg/24 hours oral capsule, extended release: 1 cap(s) orally once a day  Eliquis 5 mg oral tablet: 1 tab(s) orally 2 times a day  metoprolol succinate 25 mg oral tablet, extended release: 3 tab(s) orally once a day  predniSONE 10 mg oral tablet: 4 tab(s) orally once a day  for 3 days , 2 tab once a day for 3 days , 1 tab once a day for 3 days

## 2024-03-25 DIAGNOSIS — I48.91 UNSPECIFIED ATRIAL FIBRILLATION: ICD-10-CM

## 2024-03-25 DIAGNOSIS — Z71.6 TOBACCO ABUSE COUNSELING: ICD-10-CM

## 2024-03-25 DIAGNOSIS — I10 ESSENTIAL (PRIMARY) HYPERTENSION: ICD-10-CM

## 2024-03-25 DIAGNOSIS — I21.4 NON-ST ELEVATION (NSTEMI) MYOCARDIAL INFARCTION: ICD-10-CM

## 2024-03-25 DIAGNOSIS — Z83.3 FAMILY HISTORY OF DIABETES MELLITUS: ICD-10-CM

## 2024-03-25 DIAGNOSIS — Z90.710 ACQUIRED ABSENCE OF BOTH CERVIX AND UTERUS: ICD-10-CM

## 2024-03-25 DIAGNOSIS — E66.9 OBESITY, UNSPECIFIED: ICD-10-CM

## 2024-03-25 DIAGNOSIS — Z79.51 LONG TERM (CURRENT) USE OF INHALED STEROIDS: ICD-10-CM

## 2024-03-25 DIAGNOSIS — J44.9 CHRONIC OBSTRUCTIVE PULMONARY DISEASE, UNSPECIFIED: ICD-10-CM

## 2024-03-25 DIAGNOSIS — Z82.49 FAMILY HISTORY OF ISCHEMIC HEART DISEASE AND OTHER DISEASES OF THE CIRCULATORY SYSTEM: ICD-10-CM

## 2024-03-25 DIAGNOSIS — Z79.01 LONG TERM (CURRENT) USE OF ANTICOAGULANTS: ICD-10-CM

## 2024-03-25 DIAGNOSIS — I25.118 ATHEROSCLEROTIC HEART DISEASE OF NATIVE CORONARY ARTERY WITH OTHER FORMS OF ANGINA PECTORIS: ICD-10-CM

## 2024-03-25 DIAGNOSIS — Z90.722 ACQUIRED ABSENCE OF OVARIES, BILATERAL: ICD-10-CM

## 2024-03-25 DIAGNOSIS — F17.210 NICOTINE DEPENDENCE, CIGARETTES, UNCOMPLICATED: ICD-10-CM

## 2024-08-19 ENCOUNTER — APPOINTMENT (OUTPATIENT)
Dept: SLEEP CENTER | Facility: HOSPITAL | Age: 59
End: 2024-08-19
Payer: COMMERCIAL

## 2024-08-19 PROCEDURE — 95810 POLYSOM 6/> YRS 4/> PARAM: CPT | Mod: 26

## 2025-09-05 ENCOUNTER — EMERGENCY (EMERGENCY)
Facility: HOSPITAL | Age: 60
LOS: 0 days | Discharge: LEFT BEFORE DISCHARGE | End: 2025-09-05
Attending: EMERGENCY MEDICINE
Payer: COMMERCIAL

## 2025-09-05 VITALS
WEIGHT: 205.03 LBS | SYSTOLIC BLOOD PRESSURE: 128 MMHG | OXYGEN SATURATION: 96 % | HEART RATE: 89 BPM | DIASTOLIC BLOOD PRESSURE: 85 MMHG | RESPIRATION RATE: 18 BRPM | HEIGHT: 67 IN | TEMPERATURE: 98 F

## 2025-09-05 DIAGNOSIS — Z90.721 ACQUIRED ABSENCE OF OVARIES, UNILATERAL: Chronic | ICD-10-CM

## 2025-09-05 DIAGNOSIS — I48.91 UNSPECIFIED ATRIAL FIBRILLATION: ICD-10-CM

## 2025-09-05 DIAGNOSIS — R07.89 OTHER CHEST PAIN: ICD-10-CM

## 2025-09-05 DIAGNOSIS — Z79.01 LONG TERM (CURRENT) USE OF ANTICOAGULANTS: ICD-10-CM

## 2025-09-05 DIAGNOSIS — F17.200 NICOTINE DEPENDENCE, UNSPECIFIED, UNCOMPLICATED: ICD-10-CM

## 2025-09-05 DIAGNOSIS — M25.512 PAIN IN LEFT SHOULDER: ICD-10-CM

## 2025-09-05 DIAGNOSIS — Z53.29 PROCEDURE AND TREATMENT NOT CARRIED OUT BECAUSE OF PATIENT'S DECISION FOR OTHER REASONS: ICD-10-CM

## 2025-09-05 LAB
ALBUMIN SERPL ELPH-MCNC: 4.2 G/DL — SIGNIFICANT CHANGE UP (ref 3.5–5.2)
ALP SERPL-CCNC: 46 U/L — SIGNIFICANT CHANGE UP (ref 30–115)
ALT FLD-CCNC: 16 U/L — SIGNIFICANT CHANGE UP (ref 0–41)
ANION GAP SERPL CALC-SCNC: 13 MMOL/L — SIGNIFICANT CHANGE UP (ref 7–14)
AST SERPL-CCNC: 20 U/L — SIGNIFICANT CHANGE UP (ref 0–41)
BASOPHILS # BLD AUTO: 0.01 K/UL — SIGNIFICANT CHANGE UP (ref 0–0.2)
BASOPHILS NFR BLD AUTO: 0.2 % — SIGNIFICANT CHANGE UP (ref 0–1)
BILIRUB SERPL-MCNC: 0.3 MG/DL — SIGNIFICANT CHANGE UP (ref 0.2–1.2)
BUN SERPL-MCNC: 26 MG/DL — HIGH (ref 10–20)
CALCIUM SERPL-MCNC: 8.7 MG/DL — SIGNIFICANT CHANGE UP (ref 8.4–10.5)
CHLORIDE SERPL-SCNC: 106 MMOL/L — SIGNIFICANT CHANGE UP (ref 98–110)
CO2 SERPL-SCNC: 21 MMOL/L — SIGNIFICANT CHANGE UP (ref 17–32)
CREAT SERPL-MCNC: 1 MG/DL — SIGNIFICANT CHANGE UP (ref 0.7–1.5)
EGFR: 65 ML/MIN/1.73M2 — SIGNIFICANT CHANGE UP
EGFR: 65 ML/MIN/1.73M2 — SIGNIFICANT CHANGE UP
EOSINOPHIL # BLD AUTO: 0.03 K/UL — SIGNIFICANT CHANGE UP (ref 0–0.7)
EOSINOPHIL NFR BLD AUTO: 0.7 % — SIGNIFICANT CHANGE UP (ref 0–8)
GLUCOSE SERPL-MCNC: 79 MG/DL — SIGNIFICANT CHANGE UP (ref 70–99)
HCT VFR BLD CALC: 48.8 % — HIGH (ref 37–47)
HGB BLD-MCNC: 16.3 G/DL — HIGH (ref 12–16)
IMM GRANULOCYTES NFR BLD AUTO: 0.5 % — HIGH (ref 0.1–0.3)
LYMPHOCYTES # BLD AUTO: 1.23 K/UL — SIGNIFICANT CHANGE UP (ref 1.2–3.4)
LYMPHOCYTES # BLD AUTO: 29.1 % — SIGNIFICANT CHANGE UP (ref 20.5–51.1)
MCHC RBC-ENTMCNC: 30.2 PG — SIGNIFICANT CHANGE UP (ref 27–31)
MCHC RBC-ENTMCNC: 33.4 G/DL — SIGNIFICANT CHANGE UP (ref 32–37)
MCV RBC AUTO: 90.4 FL — SIGNIFICANT CHANGE UP (ref 81–99)
MONOCYTES # BLD AUTO: 0.62 K/UL — HIGH (ref 0.1–0.6)
MONOCYTES NFR BLD AUTO: 14.7 % — HIGH (ref 1.7–9.3)
NEUTROPHILS # BLD AUTO: 2.32 K/UL — SIGNIFICANT CHANGE UP (ref 1.4–6.5)
NEUTROPHILS NFR BLD AUTO: 54.8 % — SIGNIFICANT CHANGE UP (ref 42.2–75.2)
NRBC BLD AUTO-RTO: 0 /100 WBCS — SIGNIFICANT CHANGE UP (ref 0–0)
PLATELET # BLD AUTO: 114 K/UL — LOW (ref 130–400)
PMV BLD: 10.5 FL — HIGH (ref 7.4–10.4)
POTASSIUM SERPL-MCNC: 4.1 MMOL/L — SIGNIFICANT CHANGE UP (ref 3.5–5)
POTASSIUM SERPL-SCNC: 4.1 MMOL/L — SIGNIFICANT CHANGE UP (ref 3.5–5)
PROT SERPL-MCNC: 6.7 G/DL — SIGNIFICANT CHANGE UP (ref 6–8)
RBC # BLD: 5.4 M/UL — SIGNIFICANT CHANGE UP (ref 4.2–5.4)
RBC # FLD: 13.5 % — SIGNIFICANT CHANGE UP (ref 11.5–14.5)
SODIUM SERPL-SCNC: 140 MMOL/L — SIGNIFICANT CHANGE UP (ref 135–146)
TROPONIN T, HIGH SENSITIVITY RESULT: 8 NG/L — SIGNIFICANT CHANGE UP
WBC # BLD: 4.23 K/UL — LOW (ref 4.8–10.8)
WBC # FLD AUTO: 4.23 K/UL — LOW (ref 4.8–10.8)

## 2025-09-05 PROCEDURE — 93010 ELECTROCARDIOGRAM REPORT: CPT

## 2025-09-05 PROCEDURE — 71045 X-RAY EXAM CHEST 1 VIEW: CPT

## 2025-09-05 PROCEDURE — 99285 EMERGENCY DEPT VISIT HI MDM: CPT

## 2025-09-05 PROCEDURE — 93005 ELECTROCARDIOGRAM TRACING: CPT

## 2025-09-05 PROCEDURE — 99285 EMERGENCY DEPT VISIT HI MDM: CPT | Mod: 25

## 2025-09-05 PROCEDURE — 84484 ASSAY OF TROPONIN QUANT: CPT

## 2025-09-05 PROCEDURE — 96374 THER/PROPH/DIAG INJ IV PUSH: CPT

## 2025-09-05 PROCEDURE — 80053 COMPREHEN METABOLIC PANEL: CPT

## 2025-09-05 PROCEDURE — 85025 COMPLETE CBC W/AUTO DIFF WBC: CPT

## 2025-09-05 PROCEDURE — 36415 COLL VENOUS BLD VENIPUNCTURE: CPT

## 2025-09-05 PROCEDURE — 71045 X-RAY EXAM CHEST 1 VIEW: CPT | Mod: 26

## 2025-09-05 RX ORDER — KETOROLAC TROMETHAMINE 30 MG/ML
15 INJECTION, SOLUTION INTRAMUSCULAR; INTRAVENOUS ONCE
Refills: 0 | Status: DISCONTINUED | OUTPATIENT
Start: 2025-09-05 | End: 2025-09-05

## 2025-09-05 RX ADMIN — KETOROLAC TROMETHAMINE 15 MILLIGRAM(S): 30 INJECTION, SOLUTION INTRAMUSCULAR; INTRAVENOUS at 17:30

## 2025-09-18 NOTE — CHART NOTE - NSCHARTNOTESELECT_GEN_ALL_CORE
Problem: Risk for Injury, Hyperbaric Oxygen Therapy (HBO Department)  Goal: Remains free from injury (e.g. oxygen toxicity, fire risk)  Outcome: Monitoring/Evaluating progress  Goal: Remains free from barotrauma  Outcome: Monitoring/Evaluating progress   Patient arrived to HBO.   HBO Compression tolerated.   Repositioned during treatment.   Continue plan of care.     Importance of eating protein-rich foods as part of a balanced diet to promote wound healing was discussed with the patient.   The care of chronic wounds was reviewed, including etiology of the wound, treatment plan, signs and symptoms of infection, and when to seek medical attention.     Downgrade to Floor/Transfer Note